# Patient Record
Sex: FEMALE | Race: WHITE | NOT HISPANIC OR LATINO | Employment: OTHER | URBAN - METROPOLITAN AREA
[De-identification: names, ages, dates, MRNs, and addresses within clinical notes are randomized per-mention and may not be internally consistent; named-entity substitution may affect disease eponyms.]

---

## 2020-08-17 ENCOUNTER — OFFICE VISIT (OUTPATIENT)
Dept: FAMILY MEDICINE CLINIC | Facility: CLINIC | Age: 75
End: 2020-08-17

## 2020-08-17 VITALS
OXYGEN SATURATION: 95 % | HEART RATE: 72 BPM | BODY MASS INDEX: 35.91 KG/M2 | TEMPERATURE: 97.7 F | HEIGHT: 61 IN | SYSTOLIC BLOOD PRESSURE: 138 MMHG | WEIGHT: 190.2 LBS | DIASTOLIC BLOOD PRESSURE: 80 MMHG

## 2020-08-17 DIAGNOSIS — I10 BENIGN ESSENTIAL HTN: Primary | ICD-10-CM

## 2020-08-17 DIAGNOSIS — K21.9 GASTROESOPHAGEAL REFLUX DISEASE WITHOUT ESOPHAGITIS: ICD-10-CM

## 2020-08-17 DIAGNOSIS — I25.10 CORONARY ARTERY DISEASE INVOLVING NATIVE CORONARY ARTERY OF NATIVE HEART WITHOUT ANGINA PECTORIS: ICD-10-CM

## 2020-08-17 DIAGNOSIS — L30.9 ECZEMA, UNSPECIFIED TYPE: ICD-10-CM

## 2020-08-17 DIAGNOSIS — N64.52 NIPPLE DISCHARGE IN FEMALE: ICD-10-CM

## 2020-08-17 DIAGNOSIS — Z80.0 FAMILY HISTORY OF COLON CANCER IN FATHER: ICD-10-CM

## 2020-08-17 DIAGNOSIS — R21 RASH AND NONSPECIFIC SKIN ERUPTION: ICD-10-CM

## 2020-08-17 DIAGNOSIS — E78.5 HYPERLIPIDEMIA, UNSPECIFIED HYPERLIPIDEMIA TYPE: ICD-10-CM

## 2020-08-17 DIAGNOSIS — Z12.11 COLON CANCER SCREENING: ICD-10-CM

## 2020-08-17 PROBLEM — E07.9 THYROID DISEASE: Status: ACTIVE | Noted: 2020-08-17

## 2020-08-17 PROBLEM — I21.9 MYOCARDIAL INFARCTION: Status: ACTIVE | Noted: 2020-08-17

## 2020-08-17 PROBLEM — M50.20 CERVICAL HERNIATED DISC: Status: ACTIVE | Noted: 2020-08-17

## 2020-08-17 PROBLEM — I44.7 LBBB (LEFT BUNDLE BRANCH BLOCK): Status: ACTIVE | Noted: 2020-08-17

## 2020-08-17 PROBLEM — M51.26 LUMBAR HERNIATED DISC: Status: ACTIVE | Noted: 2020-08-17

## 2020-08-17 PROBLEM — Z95.5 S/P CORONARY ARTERY STENT PLACEMENT: Status: ACTIVE | Noted: 2020-08-17

## 2020-08-17 PROCEDURE — 99203 OFFICE O/P NEW LOW 30 MIN: CPT | Performed by: INTERNAL MEDICINE

## 2020-08-17 RX ORDER — ASPIRIN 325 MG
325 TABLET ORAL DAILY
COMMUNITY
End: 2022-09-19 | Stop reason: ALTCHOICE

## 2020-08-17 RX ORDER — MULTIVITAMIN
1 CAPSULE ORAL
COMMUNITY

## 2020-08-17 RX ORDER — LORATADINE 10 MG/1
10 TABLET ORAL DAILY
COMMUNITY
End: 2020-12-08

## 2020-08-17 RX ORDER — CHLORAL HYDRATE 500 MG
3 CAPSULE ORAL DAILY
COMMUNITY
End: 2021-03-23

## 2020-08-17 RX ORDER — COVID-19 ANTIGEN TEST
KIT MISCELLANEOUS
COMMUNITY
End: 2021-03-23

## 2020-08-17 RX ORDER — LOSARTAN POTASSIUM 25 MG/1
25 TABLET ORAL NIGHTLY
COMMUNITY
Start: 2020-02-28 | End: 2022-09-19

## 2020-08-17 RX ORDER — CARVEDILOL 25 MG/1
25 TABLET ORAL 2 TIMES DAILY
COMMUNITY
Start: 2020-02-28

## 2020-08-17 RX ORDER — OMEPRAZOLE 20 MG/1
20 CAPSULE, DELAYED RELEASE ORAL DAILY
COMMUNITY
End: 2022-09-19 | Stop reason: ALTCHOICE

## 2020-08-17 NOTE — PROGRESS NOTES
Subjective   Mayi Hannon is a 75 y.o. female.     Chief Complaint   Patient presents with   • Rash       History of Present Illness   Establishing new MD.  Has not really had a regular PMD follow up.  History of MI 2012 with no current CP, SOA, weakness, palpitations.  Intermittent right breast brownish discharge. For the last 2 years not changing.  Last mammogram 4-5 years ago.  Denies tenderness or lumps.  Patient with intermittent rash with red spots that are hard and red then some clear fluid and itching for the last 2 months.    The following portions of the patient's history were reviewed and updated as appropriate: allergies, current medications, past family history, past medical history, past social history, past surgical history and problem list.    Depression Screen:  PHQ-2/PHQ-9 Depression Screening 8/17/2020   Little interest or pleasure in doing things 0   Feeling down, depressed, or hopeless 0   Total Score 0       Past Medical History:   Diagnosis Date   • Coronary artery disease    • GERD (gastroesophageal reflux disease)    • History of chicken pox    • History of MRSA infection    • Hyperlipidemia    • Hypertension        Past Surgical History:   Procedure Laterality Date   • BILATERAL OOPHORECTOMY     • BREAST EXCISIONAL BIOPSY Left     benign findings   • CHOLECYSTECTOMY     • CORONARY STENT PLACEMENT  2012    two stents placed   • HYSTERECTOMY      29 years of age       Family History   Problem Relation Age of Onset   • Colon cancer Father    • Heart disease Father    • Ulcers Father    • Other Sister         anaphylactic shock   • Brain cancer Brother    • Stroke Brother    • Heart disease Sister        Social History     Socioeconomic History   • Marital status:      Spouse name: Not on file   • Number of children: Not on file   • Years of education: Not on file   • Highest education level: Not on file   Tobacco Use   • Smoking status: Former Smoker   • Smokeless tobacco: Never Used    Substance and Sexual Activity   • Alcohol use: Yes     Frequency: Never   • Drug use: Never   • Sexual activity: Defer       Current Outpatient Medications   Medication Sig Dispense Refill   • aspirin 325 MG tablet Take 325 mg by mouth Daily.     • carvedilol (COREG) 25 MG tablet Take 25 mg by mouth 2 (two) times a day.     • loratadine (CLARITIN) 10 MG tablet Take 10 mg by mouth Daily.     • losartan (COZAAR) 25 MG tablet Take 25 mg by mouth Daily.     • Multiple Vitamin (MULTIVITAMIN) capsule Take 1 capsule by mouth.     • Naproxen Sodium 220 MG capsule Take  by mouth.     • Omega-3 Fatty Acids (FISH OIL) 1000 MG capsule capsule Take 3 capsules by mouth Daily.     • omeprazole (priLOSEC) 20 MG capsule Take 20 mg by mouth Daily.     • triamcinolone (KENALOG) 0.1 % ointment Apply  topically to the appropriate area as directed 3 (Three) Times a Day. 30 g 1     No current facility-administered medications for this visit.        Review of Systems   Constitutional: Negative for activity change, appetite change, fatigue, fever, unexpected weight gain and unexpected weight loss.   HENT: Negative for nosebleeds, rhinorrhea, trouble swallowing and voice change.    Eyes: Negative for visual disturbance.   Respiratory: Negative for cough, chest tightness, shortness of breath and wheezing.    Cardiovascular: Negative for chest pain, palpitations and leg swelling.   Gastrointestinal: Negative for abdominal pain, blood in stool, constipation, diarrhea, nausea, vomiting, GERD and indigestion.   Genitourinary: Negative for dysuria, frequency and hematuria.   Musculoskeletal: Negative for arthralgias, back pain and myalgias.   Skin: Negative for rash and bruise.   Neurological: Negative for dizziness, tremors, weakness, light-headedness, numbness, headache and memory problem.   Hematological: Negative for adenopathy. Does not bruise/bleed easily.   Psychiatric/Behavioral: Negative for sleep disturbance and depressed mood. The  "patient is not nervous/anxious.        Objective   /80 (BP Location: Left arm, Patient Position: Sitting, Cuff Size: Large Adult)   Pulse 72   Temp 97.7 °F (36.5 °C) (Temporal)   Ht 154.9 cm (61\")   Wt 86.3 kg (190 lb 3.2 oz)   SpO2 95%   BMI 35.94 kg/m²     Physical Exam   Constitutional: She is oriented to person, place, and time. She appears well-developed and well-nourished. No distress.   HENT:   Head: Normocephalic and atraumatic.   Right Ear: External ear normal.   Left Ear: External ear normal.   Nose: Nose normal.   Mouth/Throat: Oropharynx is clear and moist.   Eyes: Pupils are equal, round, and reactive to light. Conjunctivae and EOM are normal.   Neck: Normal range of motion. Neck supple. No tracheal deviation present. No thyromegaly present.   Cardiovascular: Normal rate, regular rhythm, normal heart sounds and intact distal pulses. Exam reveals no gallop and no friction rub.   No murmur heard.  Pulmonary/Chest: Effort normal and breath sounds normal. No respiratory distress.   Abdominal: Soft. Bowel sounds are normal. She exhibits no mass. There is no tenderness. There is no guarding.   Musculoskeletal: Normal range of motion. She exhibits no edema.   Lymphadenopathy:     She has no cervical adenopathy.   Neurological: She is alert and oriented to person, place, and time. She displays normal reflexes. She exhibits normal muscle tone.   Skin: Skin is warm and dry. Capillary refill takes less than 2 seconds. No rash noted. She is not diaphoretic.   Psychiatric: She has a normal mood and affect. Her behavior is normal. Judgment and thought content normal.   Nursing note and vitals reviewed.      No results found for this or any previous visit (from the past 2016 hour(s)).  Assessment/Plan   Mayi was seen today for rash.    Diagnoses and all orders for this visit:    Benign essential HTN    Hyperlipidemia, unspecified hyperlipidemia type    Coronary artery disease involving native coronary " artery of native heart without angina pectoris    Gastroesophageal reflux disease without esophagitis    Nipple discharge in female  -     Mammo Diagnostic Bilateral With CAD; Future  -     US Breast Bilateral Complete; Future    Colon cancer screening  -     Ambulatory Referral For Screening Colonoscopy    Eczema, unspecified type  -     triamcinolone (KENALOG) 0.1 % ointment; Apply  topically to the appropriate area as directed 3 (Three) Times a Day.    Rash and nonspecific skin eruption  -     triamcinolone (KENALOG) 0.1 % ointment; Apply  topically to the appropriate area as directed 3 (Three) Times a Day.    Family history of colon cancer in father  -     Ambulatory Referral For Screening Colonoscopy    Hypertension is currently controlled no changes are needed at this time no change in medications either.  Is to continue follow-up with cardiology as scheduled who is following her cholesterol and her blood pressure.  GERD is controlled with the omeprazole 20 mg only.  Patient with a nipple discharge on the right will obtain mammogram and likely ultrasound evaluation bilaterally with history of benign breast nodule removal in the past on the left.  Rash possibly some mild eczema versus other dermatitis.  Will give triamcinolone and observe.  If she has recurring blisters with drainage she is to come to the office at that time for which we can obtain a culture of the fluid.  Patient is in need of her screening colonoscopy which we will schedule.

## 2020-08-28 ENCOUNTER — OFFICE VISIT (OUTPATIENT)
Dept: FAMILY MEDICINE CLINIC | Facility: CLINIC | Age: 75
End: 2020-08-28

## 2020-08-28 VITALS
OXYGEN SATURATION: 96 % | SYSTOLIC BLOOD PRESSURE: 144 MMHG | WEIGHT: 189.2 LBS | HEART RATE: 73 BPM | DIASTOLIC BLOOD PRESSURE: 72 MMHG | TEMPERATURE: 98.3 F | BODY MASS INDEX: 35.72 KG/M2 | HEIGHT: 61 IN

## 2020-08-28 DIAGNOSIS — E78.5 HYPERLIPIDEMIA, UNSPECIFIED HYPERLIPIDEMIA TYPE: ICD-10-CM

## 2020-08-28 DIAGNOSIS — R35.0 URINARY FREQUENCY: ICD-10-CM

## 2020-08-28 DIAGNOSIS — R21 RASH AND NONSPECIFIC SKIN ERUPTION: Primary | ICD-10-CM

## 2020-08-28 LAB
BILIRUB BLD-MCNC: NEGATIVE MG/DL
CLARITY, POC: CLEAR
COLOR UR: YELLOW
GLUCOSE UR STRIP-MCNC: NEGATIVE MG/DL
KETONES UR QL: NEGATIVE
LEUKOCYTE EST, POC: NEGATIVE
NITRITE UR-MCNC: NEGATIVE MG/ML
PH UR: 6 [PH] (ref 5–8)
PROT UR STRIP-MCNC: NEGATIVE MG/DL
RBC # UR STRIP: NEGATIVE /UL
SP GR UR: 1.01 (ref 1–1.03)
UROBILINOGEN UR QL: NORMAL

## 2020-08-28 PROCEDURE — 99214 OFFICE O/P EST MOD 30 MIN: CPT | Performed by: NURSE PRACTITIONER

## 2020-08-28 PROCEDURE — 81003 URINALYSIS AUTO W/O SCOPE: CPT | Performed by: NURSE PRACTITIONER

## 2020-08-28 NOTE — PATIENT INSTRUCTIONS
Make sure drinking adequate liquids.  Keep diary of rash symptoms.  Follow up pending lab results.  Follow up if symptoms persist or worsen.

## 2020-08-29 LAB
ALBUMIN SERPL-MCNC: 4.4 G/DL (ref 3.7–4.7)
ALBUMIN/GLOB SERPL: 1.8 {RATIO} (ref 1.2–2.2)
ALP SERPL-CCNC: 92 IU/L (ref 39–117)
ALT SERPL-CCNC: 33 IU/L (ref 0–32)
AST SERPL-CCNC: 33 IU/L (ref 0–40)
BASOPHILS # BLD AUTO: 0.1 X10E3/UL (ref 0–0.2)
BASOPHILS NFR BLD AUTO: 1 %
BILIRUB SERPL-MCNC: 0.4 MG/DL (ref 0–1.2)
BUN SERPL-MCNC: 16 MG/DL (ref 8–27)
BUN/CREAT SERPL: 15 (ref 12–28)
CALCIUM SERPL-MCNC: 10.5 MG/DL (ref 8.7–10.3)
CHLORIDE SERPL-SCNC: 104 MMOL/L (ref 96–106)
CHOLEST SERPL-MCNC: 257 MG/DL (ref 100–199)
CO2 SERPL-SCNC: 21 MMOL/L (ref 20–29)
CREAT SERPL-MCNC: 1.04 MG/DL (ref 0.57–1)
EOSINOPHIL # BLD AUTO: 0.2 X10E3/UL (ref 0–0.4)
EOSINOPHIL NFR BLD AUTO: 3 %
ERYTHROCYTE [DISTWIDTH] IN BLOOD BY AUTOMATED COUNT: 12.5 % (ref 11.7–15.4)
GLOBULIN SER CALC-MCNC: 2.4 G/DL (ref 1.5–4.5)
GLUCOSE SERPL-MCNC: 104 MG/DL (ref 65–99)
HCT VFR BLD AUTO: 42.2 % (ref 34–46.6)
HDLC SERPL-MCNC: 45 MG/DL
HGB BLD-MCNC: 13.8 G/DL (ref 11.1–15.9)
IMM GRANULOCYTES # BLD AUTO: 0 X10E3/UL (ref 0–0.1)
IMM GRANULOCYTES NFR BLD AUTO: 0 %
LDLC SERPL CALC-MCNC: 156 MG/DL (ref 0–99)
LDLC/HDLC SERPL: 3.5 RATIO (ref 0–3.2)
LYMPHOCYTES # BLD AUTO: 2.3 X10E3/UL (ref 0.7–3.1)
LYMPHOCYTES NFR BLD AUTO: 36 %
MCH RBC QN AUTO: 30 PG (ref 26.6–33)
MCHC RBC AUTO-ENTMCNC: 32.7 G/DL (ref 31.5–35.7)
MCV RBC AUTO: 92 FL (ref 79–97)
MONOCYTES # BLD AUTO: 0.4 X10E3/UL (ref 0.1–0.9)
MONOCYTES NFR BLD AUTO: 6 %
NEUTROPHILS # BLD AUTO: 3.4 X10E3/UL (ref 1.4–7)
NEUTROPHILS NFR BLD AUTO: 54 %
PLATELET # BLD AUTO: 187 X10E3/UL (ref 150–450)
POTASSIUM SERPL-SCNC: 4.7 MMOL/L (ref 3.5–5.2)
PROT SERPL-MCNC: 6.8 G/DL (ref 6–8.5)
RBC # BLD AUTO: 4.6 X10E6/UL (ref 3.77–5.28)
SODIUM SERPL-SCNC: 141 MMOL/L (ref 134–144)
TRIGL SERPL-MCNC: 280 MG/DL (ref 0–149)
VLDLC SERPL CALC-MCNC: 56 MG/DL (ref 5–40)
WBC # BLD AUTO: 6.3 X10E3/UL (ref 3.4–10.8)

## 2020-08-30 LAB
BACTERIA UR CULT: NORMAL
BACTERIA UR CULT: NORMAL

## 2020-08-31 DIAGNOSIS — E83.52 HYPERCALCEMIA: Primary | ICD-10-CM

## 2020-09-01 DIAGNOSIS — E83.52 HYPERCALCEMIA: ICD-10-CM

## 2020-09-01 LAB
BACTERIA SPEC AEROBE CULT: NORMAL
BACTERIA SPEC ANAEROBE CULT: NORMAL
BACTERIA SPEC CULT: NORMAL
BACTERIA SPEC CULT: NORMAL

## 2020-09-12 LAB
25(OH)D3+25(OH)D2 SERPL-MCNC: 24 NG/ML (ref 30–100)
CALCIUM SERPL-MCNC: 10.4 MG/DL (ref 8.7–10.3)
INTACT PTH: ABNORMAL
PTH-INTACT SERPL-MCNC: 43 PG/ML (ref 15–65)

## 2020-09-14 ENCOUNTER — PREP FOR SURGERY (OUTPATIENT)
Dept: OTHER | Facility: HOSPITAL | Age: 75
End: 2020-09-14

## 2020-09-14 DIAGNOSIS — Z80.0 FAMILY HISTORY OF MALIGNANT NEOPLASM OF COLON: Primary | ICD-10-CM

## 2020-09-15 DIAGNOSIS — E55.9 VITAMIN D DEFICIENCY: Primary | ICD-10-CM

## 2020-09-17 ENCOUNTER — APPOINTMENT (OUTPATIENT)
Dept: WOMENS IMAGING | Facility: HOSPITAL | Age: 75
End: 2020-09-17

## 2020-09-17 PROCEDURE — 76641 ULTRASOUND BREAST COMPLETE: CPT | Performed by: RADIOLOGY

## 2020-09-17 PROCEDURE — G0279 TOMOSYNTHESIS, MAMMO: HCPCS | Performed by: RADIOLOGY

## 2020-09-17 PROCEDURE — 77066 DX MAMMO INCL CAD BI: CPT | Performed by: RADIOLOGY

## 2020-09-17 PROCEDURE — 77066 DX MAMMO INCL CAD BI: CPT

## 2020-09-18 DIAGNOSIS — N64.52 NIPPLE DISCHARGE IN FEMALE: ICD-10-CM

## 2020-10-08 PROBLEM — Z80.0 FAMILY HISTORY OF MALIGNANT NEOPLASM OF COLON: Status: ACTIVE | Noted: 2020-10-08

## 2020-11-16 ENCOUNTER — TRANSCRIBE ORDERS (OUTPATIENT)
Dept: SLEEP MEDICINE | Facility: HOSPITAL | Age: 75
End: 2020-11-16

## 2020-11-16 DIAGNOSIS — Z01.818 OTHER SPECIFIED PRE-OPERATIVE EXAMINATION: Primary | ICD-10-CM

## 2020-12-07 ENCOUNTER — LAB (OUTPATIENT)
Dept: LAB | Facility: HOSPITAL | Age: 75
End: 2020-12-07

## 2020-12-07 DIAGNOSIS — Z01.818 OTHER SPECIFIED PRE-OPERATIVE EXAMINATION: ICD-10-CM

## 2020-12-07 PROCEDURE — U0004 COV-19 TEST NON-CDC HGH THRU: HCPCS

## 2020-12-07 PROCEDURE — C9803 HOPD COVID-19 SPEC COLLECT: HCPCS

## 2020-12-08 LAB — SARS-COV-2 RNA RESP QL NAA+PROBE: NOT DETECTED

## 2020-12-08 RX ORDER — BIOTIN 5 MG
3000 TABLET ORAL DAILY
COMMUNITY

## 2020-12-09 ENCOUNTER — HOSPITAL ENCOUNTER (OUTPATIENT)
Facility: HOSPITAL | Age: 75
Setting detail: HOSPITAL OUTPATIENT SURGERY
Discharge: HOME | End: 2020-12-09
Attending: INTERNAL MEDICINE | Admitting: INTERNAL MEDICINE

## 2020-12-09 ENCOUNTER — ANESTHESIA (OUTPATIENT)
Dept: GASTROENTEROLOGY | Facility: HOSPITAL | Age: 75
End: 2020-12-09

## 2020-12-09 ENCOUNTER — ANESTHESIA EVENT (OUTPATIENT)
Dept: GASTROENTEROLOGY | Facility: HOSPITAL | Age: 75
End: 2020-12-09

## 2020-12-09 VITALS
HEART RATE: 75 BPM | BODY MASS INDEX: 36.06 KG/M2 | DIASTOLIC BLOOD PRESSURE: 87 MMHG | SYSTOLIC BLOOD PRESSURE: 180 MMHG | HEIGHT: 61 IN | WEIGHT: 191 LBS | TEMPERATURE: 97 F | OXYGEN SATURATION: 9 % | RESPIRATION RATE: 16 BRPM

## 2020-12-09 PROCEDURE — S0260 H&P FOR SURGERY: HCPCS | Performed by: INTERNAL MEDICINE

## 2020-12-09 PROCEDURE — G0105 COLORECTAL SCRN; HI RISK IND: HCPCS | Performed by: INTERNAL MEDICINE

## 2020-12-09 PROCEDURE — 25010000002 PROPOFOL 10 MG/ML EMULSION: Performed by: ANESTHESIOLOGY

## 2020-12-09 RX ORDER — PROPOFOL 10 MG/ML
VIAL (ML) INTRAVENOUS CONTINUOUS PRN
Status: DISCONTINUED | OUTPATIENT
Start: 2020-12-09 | End: 2020-12-09 | Stop reason: SURG

## 2020-12-09 RX ORDER — SODIUM CHLORIDE, SODIUM LACTATE, POTASSIUM CHLORIDE, CALCIUM CHLORIDE 600; 310; 30; 20 MG/100ML; MG/100ML; MG/100ML; MG/100ML
30 INJECTION, SOLUTION INTRAVENOUS CONTINUOUS PRN
Status: DISCONTINUED | OUTPATIENT
Start: 2020-12-09 | End: 2020-12-09 | Stop reason: HOSPADM

## 2020-12-09 RX ORDER — PROPOFOL 10 MG/ML
VIAL (ML) INTRAVENOUS AS NEEDED
Status: DISCONTINUED | OUTPATIENT
Start: 2020-12-09 | End: 2020-12-09 | Stop reason: SURG

## 2020-12-09 RX ORDER — LIDOCAINE HYDROCHLORIDE 20 MG/ML
INJECTION, SOLUTION INFILTRATION; PERINEURAL AS NEEDED
Status: DISCONTINUED | OUTPATIENT
Start: 2020-12-09 | End: 2020-12-09 | Stop reason: SURG

## 2020-12-09 RX ORDER — SODIUM CHLORIDE 0.9 % (FLUSH) 0.9 %
10 SYRINGE (ML) INJECTION AS NEEDED
Status: DISCONTINUED | OUTPATIENT
Start: 2020-12-09 | End: 2020-12-09 | Stop reason: HOSPADM

## 2020-12-09 RX ADMIN — PROPOFOL 120 MG: 10 INJECTION, EMULSION INTRAVENOUS at 10:59

## 2020-12-09 RX ADMIN — PROPOFOL 160 MCG/KG/MIN: 10 INJECTION, EMULSION INTRAVENOUS at 10:59

## 2020-12-09 RX ADMIN — LIDOCAINE HYDROCHLORIDE 60 MG: 20 INJECTION, SOLUTION INFILTRATION; PERINEURAL at 10:59

## 2020-12-09 RX ADMIN — SODIUM CHLORIDE, POTASSIUM CHLORIDE, SODIUM LACTATE AND CALCIUM CHLORIDE 30 ML/HR: 600; 310; 30; 20 INJECTION, SOLUTION INTRAVENOUS at 10:46

## 2020-12-09 NOTE — ANESTHESIA PREPROCEDURE EVALUATION
Anesthesia Evaluation     Patient summary reviewed   NPO Solid Status: > 8 hours  NPO Liquid Status: > 2 hours           Airway   Mallampati: II  TM distance: >3 FB  Neck ROM: full  Dental      Pulmonary     breath sounds clear to auscultation  (+) a smoker Former,   (-) shortness of breath  Cardiovascular   Exercise tolerance: good (4-7 METS)    Rhythm: regular  Rate: normal    (+) hypertension, past MI  >12 months, CAD, cardiac stents more than 12 months ago dysrhythmias (LBBB), hyperlipidemia,   (-) angina, HERNANDEZ    ROS comment: Denies CV issues since stent placement    Neuro/Psych  GI/Hepatic/Renal/Endo    (+)  GERD,      Musculoskeletal     Abdominal    Substance History      OB/GYN          Other                      Anesthesia Plan    ASA 3     MAC   (MAC anesthesia discussed with patient and/or patient representative. Risks (including but not limited to intra-op awareness), benefits, and alternatives were discussed. Understanding was voiced with an agreement to proceed with a MAC technique and General as a backup option.   )  intravenous induction     Anesthetic plan, all risks, benefits, and alternatives have been provided, discussed and informed consent has been obtained with: patient.

## 2020-12-09 NOTE — DISCHARGE INSTRUCTIONS
For the next 24 hours patient needs to be with a responsible adult.    For 24 hours DO NOT drive, operate machinery, appliances, drink alcohol, make important decisions or sign legal documents.    Start with a light or bland diet if you are feeling sick to your stomach otherwise advance to regular diet as tolerated.    Follow recommendations on procedure report if provided by your doctor.    Call Dr BERNARDO for problems [phone]    Problems may include but not limited to: large amounts of bleeding, trouble breathing, repeated vomiting, severe unrelieved pain, fever or chills.

## 2020-12-09 NOTE — H&P
[facility]  Pre Procedure History & Physical    Chief Complaint:   fh crc/polyps    Subjective     HPI:   76 yo here today for colonoscopy.  Pt reports + FH CRC/polyps (father).  Last exam 2011    Past Medical History:   Past Medical History:   Diagnosis Date   • Coronary artery disease    • GERD (gastroesophageal reflux disease)    • History of chicken pox    • History of MRSA infection 2014    H/O ON MULTIPLE SITES ON BODY (OVER LYMPH NODES)   • Hyperlipidemia    • Hypertension        Past Surgical History:  Past Surgical History:   Procedure Laterality Date   • BILATERAL OOPHORECTOMY     • BREAST EXCISIONAL BIOPSY Left     benign findings   • CHOLECYSTECTOMY     • CORONARY STENT PLACEMENT  2012    two stents placed   • HYSTERECTOMY      29 years of age       Family History:  Family History   Problem Relation Age of Onset   • Colon cancer Father    • Heart disease Father    • Ulcers Father    • Other Sister         anaphylactic shock   • Brain cancer Brother    • Stroke Brother    • Heart disease Sister    • Malig Hyperthermia Neg Hx        Social History:   reports that she quit smoking about 47 years ago. She has never used smokeless tobacco. She reports current alcohol use. She reports that she does not use drugs.    Medications:   Medications Prior to Admission   Medication Sig Dispense Refill Last Dose   • aspirin 325 MG tablet Take 325 mg by mouth Daily.      • carvedilol (COREG) 25 MG tablet Take 25 mg by mouth 2 (two) times a day.      • Krill Oil 1000 MG capsule Take 1,000 mg by mouth Daily.      • losartan (COZAAR) 25 MG tablet Take 25 mg by mouth Every Night.      • Multiple Vitamin (MULTIVITAMIN) capsule Take 1 capsule by mouth.      • Naproxen Sodium 220 MG capsule Take  by mouth.      • Omega-3 Fatty Acids (FISH OIL) 1000 MG capsule capsule Take 3 capsules by mouth Daily.      • omeprazole (priLOSEC) 20 MG capsule Take 20 mg by mouth Daily.      • triamcinolone (KENALOG) 0.1  "% ointment Apply  topically to the appropriate area as directed 3 (Three) Times a Day. 30 g 1        Allergies:  Hydrocodone-acetaminophen, Statins, Niacin, Welchol [colesevelam hcl], Ace inhibitors, and Erythromycin    ROS:    Pertinent items are noted in HPI, all other systems reviewed and negative     Objective     Blood pressure 169/98, pulse 74, resp. rate 14, height 154.9 cm (61\"), weight 86.6 kg (191 lb), SpO2 98 %.    Physical Exam   Constitutional: Pt is oriented to person, place, and time and well-developed, well-nourished, and in no distress.   Mouth/Throat: Oropharynx is clear and moist.   Neck: Normal range of motion.   Cardiovascular: Normal rate, regular rhythm    Pulmonary/Chest: Effort normal    Abdominal: Soft. Nontender  Skin: Skin is warm and dry.   Psychiatric: Mood, memory, affect and judgment normal.     Assessment/Plan     Diagnosis:  Screening for colon cancer-fh crc/polyps    Anticipated Surgical Procedure:  colonoscopy    The risks, benefits, and alternatives of this procedure have been discussed with the patient or the responsible party- the patient understands and agrees to proceed.                                                              "

## 2020-12-09 NOTE — ANESTHESIA POSTPROCEDURE EVALUATION
"Patient: Mayi Hannon    Procedure Summary     Date: 12/09/20 Room / Location: [facility] ENDOSCOPY 4 / [facility] ENDOSCOPY    Anesthesia Start: 1055 Anesthesia Stop: 1120    Procedure: COLONOSCOPY (N/A ) Diagnosis:       Family history of malignant neoplasm of colon      (Family history of malignant neoplasm of colon [Z80.0])    Surgeon: Page Au MD Provider: Hudson Ramirez MD    Anesthesia Type: MAC ASA Status: 3          Anesthesia Type: MAC    Vitals  Vitals Value Taken Time   /87 12/09/20 1140   Temp 36.1 °C (97 °F) 12/09/20 1128   Pulse 75 12/09/20 1140   Resp 16 12/09/20 1140   SpO2 9 % 12/09/20 1140           Post Anesthesia Care and Evaluation    Patient location during evaluation: bedside  Patient participation: complete - patient participated  Level of consciousness: awake and alert  Pain management: adequate  Airway patency: patent  Anesthetic complications: No anesthetic complications    Cardiovascular status: acceptable  Respiratory status: acceptable  Hydration status: acceptable    Comments: /87 (BP Location: Left arm, Patient Position: Lying)   Pulse 75   Temp 36.1 °C (97 °F) (Oral)   Resp 16   Ht 154.9 cm (61\")   Wt 86.6 kg (191 lb)   SpO2 (!) 9%   BMI 36.09 kg/m²       "

## 2021-03-09 DIAGNOSIS — Z23 IMMUNIZATION DUE: ICD-10-CM

## 2021-03-18 ENCOUNTER — APPOINTMENT (OUTPATIENT)
Dept: WOMENS IMAGING | Facility: HOSPITAL | Age: 76
End: 2021-03-18

## 2021-03-18 PROCEDURE — 76641 ULTRASOUND BREAST COMPLETE: CPT | Performed by: RADIOLOGY

## 2021-03-19 ENCOUNTER — TELEPHONE (OUTPATIENT)
Dept: FAMILY MEDICINE CLINIC | Facility: CLINIC | Age: 76
End: 2021-03-19

## 2021-03-23 DIAGNOSIS — R92.8 ABNORMAL MAMMOGRAM OF BOTH BREASTS: Primary | ICD-10-CM

## 2021-10-06 ENCOUNTER — APPOINTMENT (OUTPATIENT)
Dept: WOMENS IMAGING | Facility: HOSPITAL | Age: 76
End: 2021-10-06

## 2021-10-06 PROCEDURE — 76641 ULTRASOUND BREAST COMPLETE: CPT | Performed by: RADIOLOGY

## 2021-10-06 PROCEDURE — 77066 DX MAMMO INCL CAD BI: CPT | Performed by: RADIOLOGY

## 2021-10-06 PROCEDURE — G0279 TOMOSYNTHESIS, MAMMO: HCPCS | Performed by: RADIOLOGY

## 2021-10-12 DIAGNOSIS — R92.8 ABNORMAL ULTRASOUND OF BREAST: Primary | ICD-10-CM

## 2022-04-19 ENCOUNTER — APPOINTMENT (OUTPATIENT)
Dept: WOMENS IMAGING | Facility: HOSPITAL | Age: 77
End: 2022-04-19

## 2022-04-19 PROCEDURE — 76641 ULTRASOUND BREAST COMPLETE: CPT | Performed by: RADIOLOGY

## 2022-04-21 DIAGNOSIS — R92.8 ABNORMAL ULTRASOUND OF BREAST: ICD-10-CM

## 2022-09-19 ENCOUNTER — OFFICE VISIT (OUTPATIENT)
Dept: FAMILY MEDICINE CLINIC | Facility: CLINIC | Age: 77
End: 2022-09-19

## 2022-09-19 VITALS
HEART RATE: 87 BPM | OXYGEN SATURATION: 98 % | SYSTOLIC BLOOD PRESSURE: 120 MMHG | BODY MASS INDEX: 33.42 KG/M2 | HEIGHT: 61 IN | TEMPERATURE: 98.4 F | DIASTOLIC BLOOD PRESSURE: 66 MMHG | WEIGHT: 177 LBS

## 2022-09-19 DIAGNOSIS — R73.01 ELEVATED FASTING BLOOD SUGAR: ICD-10-CM

## 2022-09-19 DIAGNOSIS — Z95.1 S/P CABG (CORONARY ARTERY BYPASS GRAFT): ICD-10-CM

## 2022-09-19 DIAGNOSIS — Z09 HOSPITAL DISCHARGE FOLLOW-UP: Primary | ICD-10-CM

## 2022-09-19 DIAGNOSIS — E78.5 HYPERLIPIDEMIA, UNSPECIFIED HYPERLIPIDEMIA TYPE: ICD-10-CM

## 2022-09-19 DIAGNOSIS — I10 BENIGN ESSENTIAL HTN: ICD-10-CM

## 2022-09-19 PROCEDURE — 99214 OFFICE O/P EST MOD 30 MIN: CPT | Performed by: INTERNAL MEDICINE

## 2022-09-19 RX ORDER — ROSUVASTATIN CALCIUM 5 MG/1
5 TABLET, COATED ORAL
COMMUNITY
Start: 2022-07-29

## 2022-09-19 RX ORDER — ASPIRIN 81 MG/1
162 TABLET ORAL DAILY
COMMUNITY
Start: 2022-08-30 | End: 2023-03-03

## 2022-09-19 RX ORDER — MELATONIN
1000 DAILY
COMMUNITY

## 2022-09-19 RX ORDER — LOSARTAN POTASSIUM 100 MG/1
100 TABLET ORAL DAILY
COMMUNITY
Start: 2022-08-29 | End: 2022-12-19 | Stop reason: SDUPTHER

## 2022-09-19 RX ORDER — TRAMADOL HYDROCHLORIDE 50 MG/1
TABLET ORAL
COMMUNITY
Start: 2022-08-26 | End: 2022-12-19 | Stop reason: SINTOL

## 2022-09-19 RX ORDER — AMLODIPINE BESYLATE 5 MG/1
5 TABLET ORAL DAILY
COMMUNITY
Start: 2022-08-29 | End: 2022-12-19 | Stop reason: SDUPTHER

## 2022-09-19 RX ORDER — NITROGLYCERIN 0.4 MG/1
1 TABLET SUBLINGUAL
COMMUNITY
Start: 2022-07-29

## 2022-09-19 RX ORDER — CLOPIDOGREL BISULFATE 75 MG/1
TABLET ORAL
COMMUNITY
Start: 2022-08-29 | End: 2022-12-19

## 2022-09-19 NOTE — PATIENT INSTRUCTIONS
Medicare Wellness  Personal Prevention Plan of Service     Date of Office Visit:    Encounter Provider:  Rodri Haley MD  Place of Service:  [facility]  Patient Name: Mayi Hannon  :  1945    As part of the Medicare Wellness portion of your visit today, we are providing you with this personalized preventive plan of services (PPPS). This plan is based upon recommendations of the [place] Preventive Services Task Force (USPSTF) and the Advisory Committee on Immunization Practices (ACIP).    This lists the preventive care services that should be considered, and provides dates of when you are due. Items listed as completed are up-to-date and do not require any further intervention.    Health Maintenance   Topic Date Due    DXA SCAN  Never done    TDAP/TD VACCINES (1 - Tdap) Never done    ZOSTER VACCINE (1 of 2) Never done    Pneumococcal Vaccine 65+ (1 - PCV) Never done    HEPATITIS C SCREENING  Never done    ANNUAL WELLNESS VISIT  Never done    INFLUENZA VACCINE  10/01/2022    LIPID PANEL  2023    COVID-19 Vaccine  Completed       No orders of the defined types were placed in this encounter.      Return in about 3 months (around 2022) for Next scheduled follow up.

## 2022-09-19 NOTE — PROGRESS NOTES
Subjective   Mayi Hannon is a 77 y.o. female.     Chief Complaint   Patient presents with   • Hospital Follow Up Visit     Heart surgery 8/29/22       History of Present Illness   Answers for HPI/ROS submitted by the patient on 9/17/2022  Please describe your symptoms.: Follow up after heart by-pass surgery  Have you had these symptoms before?: No  How long have you been having these symptoms?: Greater than 2 weeks  Please list any medications you are currently taking for this condition.: I will bring to visit  Please describe any probable cause for these symptoms. : Heart blockage  What is the primary reason for your visit?: Other     was present during the history-taking and subsequent discussion (and for part of the physical exam) with this patient.  Patient agrees to the presence of the individual during this visit.    Within 48 business hours after discharge our office contacted her via telephone to coordinate her care and needs.  No flowsheet data found.  Risk for Readmission (LACE) No data recorded    Patient was admitted to [facility]  ALL records were obtained and reviewed and /or discussed with admitting physician  Date of admission 8/24/2022  Date of discharge 8/29/2022  Diagnosis coronary artery disease of the native artery, left bundle branch block, hyperlipidemia, hypertension, GERD, status post CABG 8/25/2022 of LIMA to mid LAD, saphenous graft to the first and second obtuse marginal, and saphenous graft to the posterior descending artery    Hospital Course    77-year-old female who was evaluated in the cardiology office for severe multivessel coronary artery disease and determined to be a suitable candidate for elective open heart surgery.  She underwent coronary artery bypass graft on 8/25/2022 with LIMA to LAD and saphenous graft first and second obtuse marginal and the posterior descending artery.  She was extubated on postop day 1 but required increased oxygen requirements  and carding short course.  She transition nasal cannula later weaned to room air.  She did have a slight elevation in creatinine without obvious acute kidney injury that did resolve.  Physical therapy evaluated and deemed suitable candidate to return home.  There was some elevation in her blood pressure in the hospital and medications were adjusted and she was discharged on day 5.  Patient was noted to have an elevated blood sugar in the hospital of 159 with a sodium of 134 at the time of discharge.  Discharge medications of amlodipine 5 mg daily, aspirin 81 mg daily, Plavix 75 mg daily, Lasix 40 mg daily completed after 14 days, potassium chloride 20 mEq daily completed after 14 days, tramadol 50 mg as needed for pain only.  Losartan 100 mg daily, rosuvastatin 10 mg daily, and carvedilol 25 mg twice daily    Patient is a follow-up with a primary physician, cardiology Dr. Ferrer, [facility], and cardiothoracic surgery Dr. Richard Sims.    Currently c/o mild short of breath and fatigue.  Condition stable    I reviewed and requested records labs and diagnostics from the hospital with the patient and family.  The patient is to follow-up with specialist as discussed and directed.  If any problems arise or further questions develop patient is to call or to contact us for any needs.    The following portions of the patient's history were reviewed and updated as appropriate: allergies, current medications, past family history, past medical history, past social history, past surgical history and problem list.    Past Medical History:   Diagnosis Date   • Coronary artery disease    • GERD (gastroesophageal reflux disease)    • History of chicken pox    • History of MRSA infection 2014    H/O ON MULTIPLE SITES ON BODY (OVER LYMPH NODES)   • Hyperlipidemia    • Hypertension        Past Surgical History:   Procedure Laterality Date   • BILATERAL OOPHORECTOMY     • BREAST EXCISIONAL BIOPSY Left     benign findings   •  CHOLECYSTECTOMY     • COLONOSCOPY N/A 2020    Procedure: COLONOSCOPY;  Surgeon: Page Au MD;  Location: [facility] ENDOSCOPY;  Service: Gastroenterology;  Laterality: N/A;  pre- family hx colon cancer  post-- diverticulosis, hemorrhoids   • CORONARY STENT PLACEMENT      two stents placed   • HYSTERECTOMY      29 years of age       Family History   Problem Relation Age of Onset   • Colon cancer Father    • Heart disease Father    • Ulcers Father    • Other Sister         anaphylactic shock   • Brain cancer Brother    • Stroke Brother    • Heart disease Sister    • Malig Hyperthermia Neg Hx        Social History     Socioeconomic History   • Marital status:    Tobacco Use   • Smoking status: Former Smoker     Quit date:      Years since quittin.7   • Smokeless tobacco: Never Used   Vaping Use   • Vaping Use: Never used   Substance and Sexual Activity   • Alcohol use: Yes     Comment: occ   • Drug use: Never   • Sexual activity: Defer       Current Outpatient Medications   Medication Sig Dispense Refill   • amLODIPine (NORVASC) 5 MG tablet      • aspirin 81 MG EC tablet Take 81 mg by mouth Daily.     • carvedilol (COREG) 25 MG tablet Take 25 mg by mouth 2 (two) times a day.     • cholecalciferol (VITAMIN D3) 25 MCG (1000 UT) tablet Take 1,000 Units by mouth Daily.     • clopidogrel (PLAVIX) 75 MG tablet      • Krill Oil 1000 MG capsule Take 1,000 mg by mouth Daily.     • losartan (COZAAR) 100 MG tablet      • Multiple Vitamin (MULTIVITAMIN) capsule Take 1 capsule by mouth.     • nitroglycerin (NITROSTAT) 0.4 MG SL tablet Place 1 tablet under the tongue Every 5 (Five) Minutes As Needed.     • rosuvastatin (CRESTOR) 5 MG tablet      • traMADol (ULTRAM) 50 MG tablet        No current facility-administered medications for this visit.       Review of Systems   Constitutional: Positive for fatigue. Negative for activity change, appetite change, fever, unexpected weight gain and unexpected  weight loss.   HENT: Negative for nosebleeds, rhinorrhea, trouble swallowing and voice change.    Eyes: Negative for visual disturbance.   Respiratory: Positive for shortness of breath. Negative for cough, chest tightness and wheezing.    Cardiovascular: Negative for chest pain, palpitations and leg swelling.   Gastrointestinal: Negative for abdominal pain, blood in stool, constipation, diarrhea, nausea, vomiting, GERD and indigestion.   Genitourinary: Negative for dysuria, frequency and hematuria.   Musculoskeletal: Negative for arthralgias, back pain and myalgias.   Skin: Negative for rash and wound.   Neurological: Negative for dizziness, tremors, weakness, light-headedness, numbness, headache and memory problem.   Hematological: Negative for adenopathy. Does not bruise/bleed easily.   Psychiatric/Behavioral: Negative for sleep disturbance and depressed mood. The patient is not nervous/anxious.        Objective   Vitals:    09/19/22 1339   BP: 120/66   Pulse: 87   Temp: 98.4 °F (36.9 °C)   SpO2: 98%     Body mass index is 33.44 kg/m².  Physical Exam  Vitals and nursing note reviewed.   Constitutional:       General: She is not in acute distress.     Appearance: She is well-developed. She is not diaphoretic.   HENT:      Head: Normocephalic and atraumatic.      Right Ear: External ear normal.      Left Ear: External ear normal.      Nose: Nose normal.   Eyes:      Conjunctiva/sclera: Conjunctivae normal.      Pupils: Pupils are equal, round, and reactive to light.   Neck:      Thyroid: No thyromegaly.      Trachea: No tracheal deviation.   Cardiovascular:      Rate and Rhythm: Normal rate and regular rhythm.      Heart sounds: Normal heart sounds. No murmur heard.    No friction rub. No gallop.   Pulmonary:      Effort: Pulmonary effort is normal. No respiratory distress.      Breath sounds: Normal breath sounds.   Abdominal:      General: Bowel sounds are normal.      Palpations: Abdomen is soft. There is no  mass.      Tenderness: There is no abdominal tenderness. There is no guarding.   Musculoskeletal:         General: Normal range of motion.      Cervical back: Normal range of motion and neck supple.   Lymphadenopathy:      Cervical: No cervical adenopathy.   Skin:     General: Skin is warm and dry.      Capillary Refill: Capillary refill takes less than 2 seconds.      Findings: No rash.      Comments: Chest incision clean and healing well.  Left leg ramos graft harvest site healing well with no drainage.   Neurological:      Mental Status: She is alert and oriented to person, place, and time.      Motor: No abnormal muscle tone.      Deep Tendon Reflexes: Reflexes normal.   Psychiatric:         Behavior: Behavior normal.         Thought Content: Thought content normal.         Judgment: Judgment normal.           Assessment & Plan   Diagnoses and all orders for this visit:    1. Hospital discharge follow-up (Primary)    2. S/P CABG (coronary artery bypass graft)    3. Benign essential HTN    4. Hyperlipidemia, unspecified hyperlipidemia type    5. Elevated fasting blood sugar      Discussed the hospitalization and care.  Is doing much better and continue home health and PT.  Follow up with cardiology on 9/30/22 and CTS on 9/21/22 as scheduled.  Discussed the elevated blood sugar and recommend on next blood draw to have an A1c level as she is at risk for developing diabetes.    Recommend high dose flu shot in October and also recommend the prevnar-20          · COVID-19 Precautions - Patient was compliant in wearing a mask. When I saw the patient, I used appropriate personal protective equipment (PPE) including mask and eye shield (standard procedure).  Additionally, I used gown and gloves if indicated.  Hand hygiene was completed before and after seeing the patient.  · Dictated utilizing Dragon Dictation

## 2022-12-19 ENCOUNTER — OFFICE VISIT (OUTPATIENT)
Dept: FAMILY MEDICINE CLINIC | Facility: CLINIC | Age: 77
End: 2022-12-19

## 2022-12-19 VITALS
HEART RATE: 76 BPM | OXYGEN SATURATION: 98 % | WEIGHT: 177.4 LBS | SYSTOLIC BLOOD PRESSURE: 138 MMHG | BODY MASS INDEX: 33.49 KG/M2 | TEMPERATURE: 96.8 F | HEIGHT: 61 IN | DIASTOLIC BLOOD PRESSURE: 74 MMHG | RESPIRATION RATE: 18 BRPM

## 2022-12-19 DIAGNOSIS — E78.5 HYPERLIPIDEMIA, UNSPECIFIED HYPERLIPIDEMIA TYPE: ICD-10-CM

## 2022-12-19 DIAGNOSIS — I10 BENIGN ESSENTIAL HTN: Primary | ICD-10-CM

## 2022-12-19 DIAGNOSIS — S60.221A TRAUMATIC ECCHYMOSIS OF RIGHT HAND, INITIAL ENCOUNTER: ICD-10-CM

## 2022-12-19 DIAGNOSIS — I10 BENIGN ESSENTIAL HTN: ICD-10-CM

## 2022-12-19 DIAGNOSIS — Z11.59 NEED FOR HEPATITIS C SCREENING TEST: ICD-10-CM

## 2022-12-19 DIAGNOSIS — R22.42 LUMP OF SKIN OF LEFT LOWER EXTREMITY: ICD-10-CM

## 2022-12-19 DIAGNOSIS — I25.10 CORONARY ARTERY DISEASE INVOLVING NATIVE CORONARY ARTERY OF NATIVE HEART WITHOUT ANGINA PECTORIS: ICD-10-CM

## 2022-12-19 PROCEDURE — 99214 OFFICE O/P EST MOD 30 MIN: CPT | Performed by: NURSE PRACTITIONER

## 2022-12-19 RX ORDER — LOSARTAN POTASSIUM 100 MG/1
100 TABLET ORAL DAILY
Qty: 90 TABLET | Refills: 1 | Status: SHIPPED | OUTPATIENT
Start: 2022-12-19

## 2022-12-19 RX ORDER — AMLODIPINE BESYLATE 5 MG/1
5 TABLET ORAL DAILY
Qty: 90 TABLET | Refills: 1 | Status: SHIPPED | OUTPATIENT
Start: 2022-12-19

## 2022-12-19 NOTE — PROGRESS NOTES
Subjective   Mayi Hannon is a 77 y.o. female.     Chief Complaint   Patient presents with   • Hypertension       History of Present Illness   Patient presents for follow up HTN: takes Losartan and Amlodipine daily and Carvedilol twice daily; has not been monitoring BP recently; BP had been running 120-130s/70-80s; no headaches; no orthostasis; has had some swelling in legs in last week, but has been eating too many salty snacks; tries to sit with legs elevated when sitting; some walking, but not much recently; has treadmill.    F/U Hyperlipidemia: takes Rosuvastatin every other day and Krill oil daily; had some aching in arms and tenderness in chest after CABG, stopped Rosuvastatin for about 4-6 weeks to allow chest to heal and symptoms resolved; resumed Rosuvastatin daily about 2 weeks ago; has been on several medications for cholesterol in past and did not tolerate or stopped working; fasting today other than coffee with cream/sugar.    F/U CAD: takes ASA daily; recently finished Plavix few days ago since had been on medication for 3 months; had CABG 8/24/22; sees cardiology, Dr. Ferrer, has follow up in early January.    Also c/o tenderness and knot on left medial leg in area of where saphenous vein removed for CABG 3 months ago.    Alos, c/o felt popping sensation on top of right hand when lifting recliner cushion up; had put hands down into crease between cushion and arms of hair to lift up; immediately felt stinging sensation with sharp pain on top of right hand, then resolved; then noted bruising in area; still has some lump in area on right hand, symptoms improved; still has some tenderness, but has improved; had some decreased ROM, but has improved; no OTC treatment.      The following portions of the patient's history were reviewed and updated as appropriate: allergies, current medications, past family history, past medical history, past social history, past surgical history and problem  list.      Current Outpatient Medications   Medication Sig Dispense Refill   • amLODIPine (NORVASC) 5 MG tablet Take 1 tablet by mouth Daily. 90 tablet 1   • aspirin 81 MG EC tablet Take 162 mg by mouth Daily.     • carvedilol (COREG) 25 MG tablet Take 25 mg by mouth 2 (two) times a day.     • cholecalciferol (VITAMIN D3) 25 MCG (1000 UT) tablet Take 1,000 Units by mouth Daily.     • Krill Oil 1000 MG capsule Take 3,000 mg by mouth Daily.     • losartan (COZAAR) 100 MG tablet Take 1 tablet by mouth Daily. 90 tablet 1   • Multiple Vitamin (MULTIVITAMIN) capsule Take 1 capsule by mouth.     • nitroglycerin (NITROSTAT) 0.4 MG SL tablet Place 1 tablet under the tongue Every 5 (Five) Minutes As Needed.     • rosuvastatin (CRESTOR) 5 MG tablet Take 5 mg by mouth Every Other Day.       No current facility-administered medications for this visit.       Past Medical History:   Diagnosis Date   • Coronary artery disease    • GERD (gastroesophageal reflux disease)    • History of chicken pox    • History of MRSA infection 2014    H/O ON MULTIPLE SITES ON BODY (OVER LYMPH NODES)   • Hyperlipidemia    • Hypertension        Past Surgical History:   Procedure Laterality Date   • BILATERAL OOPHORECTOMY     • BREAST EXCISIONAL BIOPSY Left     benign findings   • CHOLECYSTECTOMY     • COLONOSCOPY N/A 12/9/2020    Procedure: COLONOSCOPY;  Surgeon: Page Au MD;  Location: [facility] ENDOSCOPY;  Service: Gastroenterology;  Laterality: N/A;  pre- family hx colon cancer  post-- diverticulosis, hemorrhoids   • CORONARY STENT PLACEMENT  2012    two stents placed   • HYSTERECTOMY      29 years of age       Family History   Problem Relation Age of Onset   • Colon cancer Father    • Heart disease Father    • Ulcers Father    • Other Sister         anaphylactic shock   • Brain cancer Brother    • Stroke Brother    • Heart disease Sister    • Malig Hyperthermia Neg Hx        Social History     Socioeconomic History   • Marital status:  "   Tobacco Use   • Smoking status: Former     Types: Cigarettes     Quit date:      Years since quittin.0   • Smokeless tobacco: Never   Vaping Use   • Vaping Use: Never used   Substance and Sexual Activity   • Alcohol use: Yes     Comment: occ   • Drug use: Never   • Sexual activity: Defer       Review of Systems   Constitutional: Negative for appetite change, chills, fatigue, fever, unexpected weight gain and unexpected weight loss.   HENT: Negative for ear pain and sore throat.    Eyes: Negative for blurred vision.   Respiratory: Negative for cough, chest tightness and shortness of breath.    Cardiovascular: Negative for chest pain and palpitations.   Gastrointestinal: Negative for abdominal pain, blood in stool, constipation (some at times if does not eat enough fiber), diarrhea and GERD. Indigestion: some at times if eats spicy foods, takes Rolaids as needed and helps.   Endocrine: Negative for cold intolerance, heat intolerance and polydipsia.   Genitourinary: Negative for dysuria and frequency.   Musculoskeletal: Negative for back pain. Arthralgias: see HPI.   Skin: Negative for rash.   Neurological: Negative for syncope and weakness.       Objective   Vitals:    22 1319 22 1357   BP: 150/78 138/74   BP Location: Left arm Left arm   Patient Position: Sitting Sitting   Cuff Size: Large Adult    Pulse: 76    Resp: 18    Temp: 96.8 °F (36 °C)    TempSrc: Temporal    SpO2: 98%    Weight: 80.5 kg (177 lb 6.4 oz)    Height: 154.9 cm (61\")      Body mass index is 33.52 kg/m².    Physical Exam  Vitals and nursing note reviewed.   Constitutional:       General: She is not in acute distress.     Appearance: She is well-developed and well-groomed. She is not diaphoretic.       HENT:      Head: Normocephalic.      Right Ear: External ear normal.      Left Ear: External ear normal.   Eyes:      Conjunctiva/sclera: Conjunctivae normal.   Neck:      Vascular: No carotid bruit.   Cardiovascular: "      Rate and Rhythm: Normal rate and regular rhythm.      Pulses: Normal pulses.      Heart sounds: Normal heart sounds. No murmur heard.  Pulmonary:      Effort: Pulmonary effort is normal. No respiratory distress.      Breath sounds: Normal breath sounds.   Abdominal:      General: Bowel sounds are normal.      Palpations: Abdomen is soft. There is no hepatomegaly or splenomegaly.      Tenderness: There is no abdominal tenderness. There is no guarding.   Musculoskeletal:      Cervical back: Normal range of motion and neck supple.      Right lower leg: Right lower leg edema: trace ankle.      Left lower leg: Left lower leg edema: trace ankle.   Skin:     General: Skin is warm and dry.      Findings: No rash.          Neurological:      Mental Status: She is alert and oriented to person, place, and time.      Gait: Gait is intact.   Psychiatric:         Mood and Affect: Mood normal.         Behavior: Behavior normal.         Thought Content: Thought content normal.         Cognition and Memory: Cognition normal.         Judgment: Judgment normal.         Lab Results   Component Value Date    WBC 6.3 08/28/2020    RBC 4.60 08/28/2020    HGB 11.2 (L) 08/24/2022    HCT 33.5 (L) 08/24/2022    MCV 92 08/28/2020    MCH 30.0 08/28/2020    MCHC 32.7 08/28/2020    RDW 12.5 08/28/2020    PLT 92 (L) 08/24/2022    NEUTRORELPCT 54 08/28/2020    LYMPHORELPCT 36 08/28/2020    MONORELPCT 6 08/28/2020    EOSRELPCT 3 08/28/2020    BASORELPCT 1 08/28/2020    NEUTROABS 3.4 08/28/2020    LYMPHSABS 2.3 08/28/2020    MONOSABS 0.4 08/28/2020    EOSABS 0.2 08/28/2020    BASOSABS 0.1 08/28/2020     Lab Results   Component Value Date    GLUCOSE 104 (H) 08/28/2020    BUN 16 08/28/2020    CREATININE 1.04 (H) 08/28/2020    EGFRIFNONA 53 (L) 08/28/2020    EGFRIFAFRI 61 08/28/2020    BCR 15 08/28/2020    K 4.6 08/25/2022    CO2 21 08/28/2020    CALCIUM 10.4 (H) 09/11/2020    PROTENTOTREF 6.8 08/28/2020    ALBUMIN 4.4 08/28/2020    LABIL2 1.8  08/28/2020    AST 33 08/28/2020    ALT 33 (H) 08/28/2020      Lab Results   Component Value Date    CHLPL 257 (H) 08/28/2020    TRIG 280 (H) 08/28/2020    HDL 45 08/28/2020    VLDL 56 (H) 08/28/2020     (H) 08/28/2020     Lab Results   Component Value Date    HGBA1C 5.4 08/16/2022     Lab Results   Component Value Date    COLORU Yellow 08/28/2020    CLARITYU Clear 08/28/2020    LEUKOCYTESUR Negative 08/28/2020    BILIRUBINUR Negative 08/28/2020 8/29/22 CBC WNL except Hgb 10.9, Hct 32.5; BMP WNL except sodium 134, glucose 159; mag 1.7    Assessment    Problem List Items Addressed This Visit     Benign essential HTN - Primary    Current Assessment & Plan     Hypertension is stable.  Regular aerobic exercise.  Continue current medications.  Ambulatory blood pressure monitoring.  Blood pressure will be reassessed at the next regular appointment.  Continue Losartan and Amlodipine daily.  Continue Carvedilol twice daily.  Decrease intake of salt in diet.         Relevant Medications    losartan (COZAAR) 100 MG tablet    amLODIPine (NORVASC) 5 MG tablet    Other Relevant Orders    CBC & Differential    Comprehensive Metabolic Panel    Lipid Panel With LDL / HDL Ratio    Coronary artery disease    Current Assessment & Plan     Continue ASA daily.  Follow up as scheduled with cardiology.         Relevant Medications    amLODIPine (NORVASC) 5 MG tablet    Hyperlipidemia    Current Assessment & Plan     Continue Rosuvastatin daily.  Continue to work on healthy diet and exercise.         Relevant Orders    Comprehensive Metabolic Panel    Lipid Panel With LDL / HDL Ratio    Traumatic ecchymosis of right hand    Current Assessment & Plan     Improving.  Continue to monitor symptoms.         Lump of skin of left lower extremity    Current Assessment & Plan     Some improvement.  Continue to monitor symptoms.        Other Visit Diagnoses     Need for hepatitis C screening test        Relevant Orders    Hepatitis C  Antibody          Patient will get labs drawn when better hydrated since she is hard stick.  Return in about 3 months (around 3/19/2023) for Medicare Wellness, Recheck.or sooner if symptoms persist or worsen.  Pt declines US of LLE lump; pt will check with cardiology at upcoming follow up due to close proximity to incision with retrieval of SVG.  Name and phone number of dermatologist in area given for skin check.       COVID-19 Precautions - Patient was compliant in wearing a mask. When I saw the patient, I used appropriate personal protective equipment (PPE) including mask, gloves, and eye shield (standard procedure).  Hand hygiene was completed before and after seeing the patient.

## 2022-12-19 NOTE — PATIENT INSTRUCTIONS
Continue to monitor your blood pressure periodically and record results.  Continue to work on healthy diet and exercise.  Follow up pending lab results.  Follow up in 3 months for Medicare Wellness, or sooner if symptoms persist or worsen.

## 2022-12-20 PROBLEM — R22.42 LUMP OF SKIN OF LEFT LOWER EXTREMITY: Status: ACTIVE | Noted: 2022-12-20

## 2022-12-20 PROBLEM — S60.221A TRAUMATIC ECCHYMOSIS OF RIGHT HAND: Status: ACTIVE | Noted: 2022-12-20

## 2022-12-20 NOTE — ASSESSMENT & PLAN NOTE
Hypertension is stable.  Regular aerobic exercise.  Continue current medications.  Ambulatory blood pressure monitoring.  Blood pressure will be reassessed at the next regular appointment.  Continue Losartan and Amlodipine daily.  Continue Carvedilol twice daily.  Decrease intake of salt in diet.

## 2022-12-21 DIAGNOSIS — E83.52 HYPERCALCEMIA: ICD-10-CM

## 2022-12-21 DIAGNOSIS — R73.01 ELEVATED FASTING BLOOD SUGAR: Primary | ICD-10-CM

## 2022-12-21 LAB
ALBUMIN SERPL-MCNC: 4.3 G/DL (ref 3.5–5.2)
ALBUMIN/GLOB SERPL: 1.9 G/DL
ALP SERPL-CCNC: 82 U/L (ref 39–117)
ALT SERPL-CCNC: 25 U/L (ref 1–33)
AST SERPL-CCNC: 26 U/L (ref 1–32)
BASOPHILS # BLD AUTO: 0.02 10*3/MM3 (ref 0–0.2)
BASOPHILS NFR BLD AUTO: 0.4 % (ref 0–1.5)
BILIRUB SERPL-MCNC: 0.4 MG/DL (ref 0–1.2)
BUN SERPL-MCNC: 15 MG/DL (ref 8–23)
BUN/CREAT SERPL: 16.7 (ref 7–25)
CALCIUM SERPL-MCNC: 10.6 MG/DL (ref 8.6–10.5)
CHLORIDE SERPL-SCNC: 107 MMOL/L (ref 98–107)
CHOLEST SERPL-MCNC: 154 MG/DL (ref 0–200)
CO2 SERPL-SCNC: 26.4 MMOL/L (ref 22–29)
CREAT SERPL-MCNC: 0.9 MG/DL (ref 0.57–1)
EGFRCR SERPLBLD CKD-EPI 2021: 66 ML/MIN/1.73
EOSINOPHIL # BLD AUTO: 0.21 10*3/MM3 (ref 0–0.4)
EOSINOPHIL NFR BLD AUTO: 3.8 % (ref 0.3–6.2)
ERYTHROCYTE [DISTWIDTH] IN BLOOD BY AUTOMATED COUNT: 12.7 % (ref 12.3–15.4)
GLOBULIN SER CALC-MCNC: 2.3 GM/DL
GLUCOSE SERPL-MCNC: 116 MG/DL (ref 65–99)
HCT VFR BLD AUTO: 38.3 % (ref 34–46.6)
HCV AB S/CO SERPL IA: <0.1 S/CO RATIO (ref 0–0.9)
HDLC SERPL-MCNC: 56 MG/DL (ref 40–60)
HGB BLD-MCNC: 12.7 G/DL (ref 12–15.9)
IMM GRANULOCYTES # BLD AUTO: 0.01 10*3/MM3 (ref 0–0.05)
IMM GRANULOCYTES NFR BLD AUTO: 0.2 % (ref 0–0.5)
LDLC SERPL CALC-MCNC: 68 MG/DL (ref 0–100)
LDLC/HDLC SERPL: 1.1 {RATIO}
LYMPHOCYTES # BLD AUTO: 1.81 10*3/MM3 (ref 0.7–3.1)
LYMPHOCYTES NFR BLD AUTO: 32.8 % (ref 19.6–45.3)
MCH RBC QN AUTO: 31 PG (ref 26.6–33)
MCHC RBC AUTO-ENTMCNC: 33.2 G/DL (ref 31.5–35.7)
MCV RBC AUTO: 93.4 FL (ref 79–97)
MONOCYTES # BLD AUTO: 0.31 10*3/MM3 (ref 0.1–0.9)
MONOCYTES NFR BLD AUTO: 5.6 % (ref 5–12)
NEUTROPHILS # BLD AUTO: 3.15 10*3/MM3 (ref 1.7–7)
NEUTROPHILS NFR BLD AUTO: 57.2 % (ref 42.7–76)
NRBC BLD AUTO-RTO: 0 /100 WBC (ref 0–0.2)
PLATELET # BLD AUTO: 168 10*3/MM3 (ref 140–450)
POTASSIUM SERPL-SCNC: 4.7 MMOL/L (ref 3.5–5.2)
PROT SERPL-MCNC: 6.6 G/DL (ref 6–8.5)
RBC # BLD AUTO: 4.1 10*6/MM3 (ref 3.77–5.28)
SODIUM SERPL-SCNC: 142 MMOL/L (ref 136–145)
TRIGL SERPL-MCNC: 181 MG/DL (ref 0–150)
VLDLC SERPL CALC-MCNC: 30 MG/DL (ref 5–40)
WBC # BLD AUTO: 5.51 10*3/MM3 (ref 3.4–10.8)

## 2022-12-22 DIAGNOSIS — E83.52 HYPERCALCEMIA: ICD-10-CM

## 2022-12-22 DIAGNOSIS — R73.01 ELEVATED FASTING BLOOD SUGAR: ICD-10-CM

## 2023-01-05 LAB
25(OH)D3+25(OH)D2 SERPL-MCNC: 69.2 NG/ML (ref 30–100)
CALCIUM SERPL-MCNC: 10.5 MG/DL (ref 8.7–10.3)
HBA1C MFR BLD: 5.7 % (ref 4.8–5.6)
INTACT PTH: ABNORMAL
PTH-INTACT SERPL-MCNC: 29 PG/ML (ref 15–65)

## 2023-01-06 PROBLEM — R73.03 PREDIABETES: Status: ACTIVE | Noted: 2022-09-19

## 2023-03-03 ENCOUNTER — OFFICE VISIT (OUTPATIENT)
Dept: FAMILY MEDICINE CLINIC | Facility: CLINIC | Age: 78
End: 2023-03-03
Payer: MEDICARE

## 2023-03-03 DIAGNOSIS — J01.90 ACUTE NON-RECURRENT SINUSITIS, UNSPECIFIED LOCATION: Primary | ICD-10-CM

## 2023-03-03 DIAGNOSIS — J20.9 ACUTE BRONCHITIS, UNSPECIFIED ORGANISM: ICD-10-CM

## 2023-03-03 PROBLEM — R73.01 ELEVATED FASTING BLOOD SUGAR: Status: ACTIVE | Noted: 2022-09-19

## 2023-03-03 PROBLEM — Z79.82 LONG TERM (CURRENT) USE OF ASPIRIN: Status: ACTIVE | Noted: 2022-08-30

## 2023-03-03 PROBLEM — Z80.0 FAMILY HISTORY OF MALIGNANT NEOPLASM OF DIGESTIVE ORGANS: Status: ACTIVE | Noted: 2020-08-17

## 2023-03-03 PROBLEM — Z95.5 PRESENCE OF CORONARY ANGIOPLASTY IMPLANT AND GRAFT: Status: ACTIVE | Noted: 2022-08-30

## 2023-03-03 PROCEDURE — 99213 OFFICE O/P EST LOW 20 MIN: CPT | Performed by: NURSE PRACTITIONER

## 2023-03-03 RX ORDER — ASPIRIN 325 MG
325 TABLET, DELAYED RELEASE (ENTERIC COATED) ORAL DAILY
COMMUNITY

## 2023-03-03 RX ORDER — LORATADINE 10 MG/1
10 TABLET ORAL
COMMUNITY

## 2023-03-03 RX ORDER — AMOXICILLIN AND CLAVULANATE POTASSIUM 875; 125 MG/1; MG/1
1 TABLET, FILM COATED ORAL 2 TIMES DAILY
Qty: 14 TABLET | Refills: 0 | Status: SHIPPED | OUTPATIENT
Start: 2023-03-03 | End: 2023-03-10

## 2023-03-03 NOTE — PATIENT INSTRUCTIONS
Increase intake of clear liquids and rest.  Try plain Mucinex to thin secretions.  May try Delsym for cough.  Try nasal steroid, such as Nasacort or Flonase.  Continue Albuterol nebulizer as needed.  Follow up if symptoms persist or worsen.

## 2023-03-03 NOTE — PROGRESS NOTES
Subjective   Mayi Hannon is a 77 y.o. female.     Chief Complaint   Patient presents with   • Cough   • URI     x1 week     You have chosen to receive care through a telehealth visit.  Do you consent to use a video/audio connection for your medical care today? Yes    This was an audio and video enabled telemedicine encounter using [employer] from patient's home in [place], [place] with provider in office in [facility].    History of Present Illness   Patient presents with c/o cough and congestion for over 1 week; has been feeling run down; symptoms started in head and now into lungs; has had productive cough--thick green sputum; some low grade fever; no ear pain, has had sore throat; some SOA; has been using Albuterol nebulizer every 4 hours and helps short term; no wheezing; no nausea, vomiting, or diarrhea; no known exposure to illness; has tried Tylenol or Aleve and help short term; takes Claritin daily; no other OTC treatment.      The following portions of the patient's history were reviewed and updated as appropriate: allergies, current medications, past family history, past medical history, past social history, past surgical history and problem list.    Current Outpatient Medications on File Prior to Visit   Medication Sig   • albuterol (PROVENTIL) (2.5 MG/3ML) 0.083% nebulizer solution Take 2.5 mg by nebulization Every 4 (Four) Hours As Needed for Wheezing.   • amLODIPine (NORVASC) 5 MG tablet Take 1 tablet by mouth Daily.   • aspirin  MG tablet Take 1 tablet by mouth Daily.   • carvedilol (COREG) 25 MG tablet Take 1 tablet by mouth 2 (two) times a day.   • cholecalciferol (VITAMIN D3) 25 MCG (1000 UT) tablet Take 1 tablet by mouth Daily.   • Krill Oil 1000 MG capsule Take 3,000 mg by mouth Daily.   • loratadine (CLARITIN) 10 MG tablet Take 1 tablet by mouth.   • losartan (COZAAR) 100 MG tablet Take 1 tablet by mouth Daily.   • Multiple Vitamin (MULTIVITAMIN) capsule Take 1 capsule by  mouth.   • nitroglycerin (NITROSTAT) 0.4 MG SL tablet Place 1 tablet under the tongue Every 5 (Five) Minutes As Needed.   • rosuvastatin (CRESTOR) 5 MG tablet Take 1 tablet by mouth Every 72 (Seventy-Two) Hours As Needed.     No current facility-administered medications on file prior to visit.        Past Medical History:   Diagnosis Date   • Coronary artery disease    • GERD (gastroesophageal reflux disease)    • History of chicken pox    • History of MRSA infection     H/O ON MULTIPLE SITES ON BODY (OVER LYMPH NODES)   • Hyperlipidemia    • Hypertension        Past Surgical History:   Procedure Laterality Date   • BILATERAL OOPHORECTOMY     • BREAST EXCISIONAL BIOPSY Left     benign findings   • CHOLECYSTECTOMY     • COLONOSCOPY N/A 2020    Procedure: COLONOSCOPY;  Surgeon: Page Au MD;  Location: [facility] ENDOSCOPY;  Service: Gastroenterology;  Laterality: N/A;  pre- family hx colon cancer  post-- diverticulosis, hemorrhoids   • CORONARY STENT PLACEMENT      two stents placed   • HYSTERECTOMY      29 years of age       Family History   Problem Relation Age of Onset   • Colon cancer Father    • Heart disease Father    • Ulcers Father    • Other Sister         anaphylactic shock   • Brain cancer Brother    • Stroke Brother    • Heart disease Sister    • Malig Hyperthermia Neg Hx        Social History     Socioeconomic History   • Marital status:    Tobacco Use   • Smoking status: Former     Types: Cigarettes     Quit date:      Years since quittin.2   • Smokeless tobacco: Never   Vaping Use   • Vaping Use: Never used   Substance and Sexual Activity   • Alcohol use: Yes     Comment: occ   • Drug use: Never   • Sexual activity: Defer       Review of Systems   Constitutional: Positive for fatigue (has malaise) and fever. Negative for unexpected weight gain and unexpected weight loss. Appetite change: some decreased appetite.   HENT: Positive for postnasal drip and sinus pressure.  Negative for trouble swallowing. Rhinorrhea: runny and stuffy nose.    Respiratory: Positive for cough. Chest tightness: some. Shortness of breath: see HPI.    Cardiovascular: Negative for chest pain, palpitations and leg swelling.   Gastrointestinal: Negative for abdominal pain.   Musculoskeletal: Negative for back pain.   Skin: Negative for rash.   Neurological: Negative for dizziness, light-headedness and headache.       Objective   There were no vitals filed for this visit.  There is no height or weight on file to calculate BMI.    Physical Exam  Constitutional:       General: She is not in acute distress.     Appearance: She is well-developed and well-groomed. She is ill-appearing. She is not diaphoretic.   HENT:      Head: Normocephalic.      Nose: Nose normal.      Right Sinus: No maxillary sinus tenderness or frontal sinus tenderness.      Left Sinus: No maxillary sinus tenderness or frontal sinus tenderness (per patient palpation).   Eyes:      Conjunctiva/sclera: Conjunctivae normal.   Pulmonary:      Effort: Pulmonary effort is normal. No accessory muscle usage (mild increased work of breathing at times) or respiratory distress.   Abdominal:      Tenderness: There is no abdominal tenderness (per patient palpation).   Musculoskeletal:      Cervical back: Normal range of motion and neck supple.   Neurological:      Mental Status: She is alert and oriented to person, place, and time.   Psychiatric:         Mood and Affect: Mood normal.         Thought Content: Thought content normal.         Cognition and Memory: Cognition normal.         Judgment: Judgment normal.         Lab Results   Component Value Date    WBC 5.51 12/20/2022    RBC 4.10 12/20/2022    HGB 12.7 12/20/2022    HCT 38.3 12/20/2022    MCV 93.4 12/20/2022    MCH 31.0 12/20/2022    MCHC 33.2 12/20/2022    RDW 12.7 12/20/2022     12/20/2022    NEUTRORELPCT 57.2 12/20/2022    LYMPHORELPCT 32.8 12/20/2022    MONORELPCT 5.6 12/20/2022     EOSRELPCT 3.8 12/20/2022    BASORELPCT 0.4 12/20/2022    NEUTROABS 3.15 12/20/2022    LYMPHSABS 1.81 12/20/2022    MONOSABS 0.31 12/20/2022    EOSABS 0.21 12/20/2022    BASOSABS 0.02 12/20/2022    NRBC 0.0 12/20/2022     Lab Results   Component Value Date    GLUCOSE 116 (H) 12/20/2022    BUN 15 12/20/2022    CREATININE 0.90 12/20/2022    EGFRIFNONA 53 (L) 08/28/2020    EGFRIFAFRI 61 08/28/2020    BCR 16.7 12/20/2022    K 4.7 12/20/2022    CO2 26.4 12/20/2022    CALCIUM 10.5 (H) 01/04/2023    PROTENTOTREF 6.6 12/20/2022    ALBUMIN 4.30 12/20/2022    LABIL2 1.9 12/20/2022    AST 26 12/20/2022    ALT 25 12/20/2022      Lab Results   Component Value Date    CHLPL 154 12/20/2022    TRIG 181 (H) 12/20/2022    HDL 56 12/20/2022    VLDL 30 12/20/2022    LDL 68 12/20/2022     Lab Results   Component Value Date    HGBA1C 5.7 (H) 01/04/2023     Lab Results   Component Value Date    COLORU Yellow 08/28/2020    CLARITYU Clear 08/28/2020    LEUKOCYTESUR Negative 08/28/2020    BILIRUBINUR Negative 08/28/2020 12/20/22 eGFR 66    Assessment    Problem List Items Addressed This Visit     Acute bronchitis    Current Assessment & Plan     Increase intake of clear liquids and rest.  Try plain Mucinex to thin secretions.  May try Delsym for cough.  Continue Albuterol nebulizer as needed.         Relevant Medications    loratadine (CLARITIN) 10 MG tablet    amoxicillin-clavulanate (Augmentin) 875-125 MG per tablet    albuterol (PROVENTIL) (2.5 MG/3ML) 0.083% nebulizer solution    Acute non-recurrent sinusitis - Primary    Current Assessment & Plan     Increase intake of clear liquids and rest.  Try plain Mucinex to thin secretions.  May try Delsym for cough.  Try nasal steroid, such as Nasacort or Flonase.         Relevant Medications    amoxicillin-clavulanate (Augmentin) 875-125 MG per tablet        Return if symptoms worsen or fail to improve.   Will get chest x-ray if symptoms persist or worsen.    Time spent doing video visit,  reviewing, discussing, answering questions, and educating patient was 20 minutes.

## 2023-03-04 PROBLEM — J20.9 ACUTE BRONCHITIS: Status: ACTIVE | Noted: 2023-03-04

## 2023-03-04 PROBLEM — J01.90 ACUTE NON-RECURRENT SINUSITIS: Status: ACTIVE | Noted: 2023-03-04

## 2023-03-04 RX ORDER — ALBUTEROL SULFATE 2.5 MG/3ML
2.5 SOLUTION RESPIRATORY (INHALATION) EVERY 4 HOURS PRN
COMMUNITY

## 2023-03-04 NOTE — ASSESSMENT & PLAN NOTE
Increase intake of clear liquids and rest.  Try plain Mucinex to thin secretions.  May try Delsym for cough.  Continue Albuterol nebulizer as needed.

## 2023-03-04 NOTE — ASSESSMENT & PLAN NOTE
Increase intake of clear liquids and rest.  Try plain Mucinex to thin secretions.  May try Delsym for cough.  Try nasal steroid, such as Nasacort or Flonase.

## 2023-03-20 ENCOUNTER — TELEPHONE (OUTPATIENT)
Dept: FAMILY MEDICINE CLINIC | Facility: CLINIC | Age: 78
End: 2023-03-20

## 2023-03-20 NOTE — TELEPHONE ENCOUNTER
HUB CAN READ AND RESCHEDULE    Patient had appt 3/20/2023.  Provider had a family emergency and needed to reschedule.

## 2023-04-13 ENCOUNTER — OFFICE VISIT (OUTPATIENT)
Dept: FAMILY MEDICINE CLINIC | Facility: CLINIC | Age: 78
End: 2023-04-13
Payer: MEDICARE

## 2023-04-13 VITALS
OXYGEN SATURATION: 97 % | SYSTOLIC BLOOD PRESSURE: 142 MMHG | HEIGHT: 61 IN | HEART RATE: 74 BPM | DIASTOLIC BLOOD PRESSURE: 78 MMHG | WEIGHT: 171 LBS | BODY MASS INDEX: 32.28 KG/M2

## 2023-04-13 DIAGNOSIS — Z00.00 MEDICARE ANNUAL WELLNESS VISIT, SUBSEQUENT: Primary | ICD-10-CM

## 2023-04-13 DIAGNOSIS — I10 BENIGN ESSENTIAL HTN: ICD-10-CM

## 2023-04-13 DIAGNOSIS — Z23 IMMUNIZATION DUE: ICD-10-CM

## 2023-04-13 DIAGNOSIS — R73.03 PREDIABETES: ICD-10-CM

## 2023-04-13 DIAGNOSIS — E78.5 HYPERLIPIDEMIA, UNSPECIFIED HYPERLIPIDEMIA TYPE: ICD-10-CM

## 2023-04-13 DIAGNOSIS — I25.10 CORONARY ARTERY DISEASE INVOLVING NATIVE CORONARY ARTERY OF NATIVE HEART WITHOUT ANGINA PECTORIS: ICD-10-CM

## 2023-04-13 PROBLEM — Z78.9 STATIN INTOLERANCE: Status: ACTIVE | Noted: 2023-03-16

## 2023-04-13 RX ORDER — UBIDECARENONE 200 MG
200 CAPSULE ORAL DAILY
COMMUNITY

## 2023-04-13 RX ORDER — BEMPEDOIC ACID 180 MG/1
180 TABLET, FILM COATED ORAL DAILY
COMMUNITY
Start: 2023-03-15

## 2023-04-13 NOTE — PATIENT INSTRUCTIONS
Medicare Wellness  Personal Prevention Plan of Service     Date of Office Visit:    Encounter Provider:  Rodri Haley MD  Place of Service:  [facility] PRIMARY CARE  Patient Name: Mayi Hannon  :  1945    As part of the Medicare Wellness portion of your visit today, we are providing you with this personalized preventive plan of services (PPPS). This plan is based upon recommendations of the [place] Preventive Services Task Force (USPSTF) and the Advisory Committee on Immunization Practices (ACIP).    This lists the preventive care services that should be considered, and provides dates of when you are due. Items listed as completed are up-to-date and do not require any further intervention.    Health Maintenance   Topic Date Due    DXA SCAN  Never done    TDAP/TD VACCINES (1 - Tdap) Never done    ZOSTER VACCINE (1 of 2) Never done    Pneumococcal Vaccine 65+ (1 - PCV) Never done    INFLUENZA VACCINE  2023    LIPID PANEL  03/15/2024    ANNUAL WELLNESS VISIT  2024    HEPATITIS C SCREENING  Completed    COVID-19 Vaccine  Completed    COLORECTAL CANCER SCREENING  Discontinued       Orders Placed This Encounter   Procedures    Pneumococcal Conjugate Vaccine 20-Valent All       Return in about 6 months (around 10/13/2023) for Next scheduled follow up.

## 2023-04-13 NOTE — PROGRESS NOTES
Subsequent Medicare Wellness Visit   The ABC's of the Annual Wellness Visit    Chief Complaint   Patient presents with   • Medicare Wellness-subsequent   • Hyperlipidemia       HPI:  Mayi Hannon, -1945, is a 77 y.o. female who presents for a Subsequent Medicare Wellness Visit.    Follow-up for hypertension.  Currently, has been feeling well and asymptomatic without any headaches, vision changes, cough, chest pain, shortness of breath, swelling, focal neurologic deficit, memory loss or syncope.  Has been taking the medications regularly and adherent with the regimen amlodipine 5 mg daily, carvedilol 25 mg twice a day, and losartan 100 mg daily.  Denies medication side effects and no significant interval events.      Follow-up for cholesterol.  Currently, has been feeling well without any myalgias, muscle aches, weakness, numbness, chest pain, short of breath or other issues.  Currently, is adherent with medication regimen of nexletol 180 mg and rosuvastatin 5 mg every 3rd day due to statin intolerance and denies medication side effects.     History of prediabetes with last A1c on 23 of 5.7%.  Now trying to follow the diet and loose weight.    Recent Hospitalizations:  Recently treated at the following:  Other: [facility] for coronary artery disease with CABG by Dr. Sims x4 with left internal mammary artery to mid LAD, reverse saphenous vein graft first and second obtuse marginal, and reverse saphenous graft to the posterior descending artery..    Current Medical Providers:  Patient Care Team:  Rodri Haley MD as PCP - General (Internal Medicine)  Giorgio Ferrer MD as Consulting Physician (Cardiology)  Leonardo Sims MD (Cardiothoracic Surgery)    Health Habits and Functional and Cognitive Screening and Depression Screenin/13/2023     4:00 PM   Functional & Cognitive Status   Do you have difficulty preparing food and eating? No   Do you have difficulty bathing yourself,  getting dressed or grooming yourself? No   Do you have difficulty using the toilet? No   Do you have difficulty moving around from place to place? No   Do you have trouble with steps or getting out of a bed or a chair? No   Current Diet Unhealthy Diet   Dental Exam Not up to date   Eye Exam Up to date   Exercise (times per week) 0 times per week   Current Exercises Include No Regular Exercise   Do you need help using the phone?  No   Are you deaf or do you have serious difficulty hearing?  No   Do you need help with transportation? No   Do you need help shopping? No   Do you need help preparing meals?  No   Do you need help with housework?  No   Do you need help with laundry? No   Do you need help taking your medications? No   Do you need help managing money? No   Do you ever drive or ride in a car without wearing a seat belt? No   Have you felt unusual stress, anger or loneliness in the last month? Yes   Who do you live with? Spouse   If you need help, do you have trouble finding someone available to you? No   Have you been bothered in the last four weeks by sexual problems? No   Do you have difficulty concentrating, remembering or making decisions? No     Son diagnosed with cancer but getting worse that is affecting her mood and stress induced.    Compared to one year ago, the patient feels her physical health is the same and her mental health is the same.    Depression Screen:      4/13/2023     4:20 PM   PHQ-2/PHQ-9 Depression Screening   Little Interest or Pleasure in Doing Things 0-->not at all   Feeling Down, Depressed or Hopeless 0-->not at all   PHQ-9: Brief Depression Severity Measure Score 0     Falls Risk Assessment:  VINH Fall Risk Clinician Key Questions   Have you fallen in the past year?: No  Do you feel unsteady with walking?: No  Are you worried about falling?: No    Past Medical/Family/Social History:  The following portions of the patient's history were reviewed and updated as appropriate:  allergies, current medications, past family history, past medical history, past social history, past surgical history and problem list.    Allergies   Allergen Reactions   • Hydrocodone-Acetaminophen Nausea Only   • Statins Myalgia     myalgia   • Niacin Other (See Comments)     Flushing   • Welchol [Colesevelam Hcl] Myalgia   • Ace Inhibitors Cough     cough   • Erythromycin Rash         Current Outpatient Medications:   •  albuterol (PROVENTIL) (2.5 MG/3ML) 0.083% nebulizer solution, Take 2.5 mg by nebulization Every 4 (Four) Hours As Needed for Wheezing., Disp: , Rfl:   •  amLODIPine (NORVASC) 5 MG tablet, Take 1 tablet by mouth Daily., Disp: 90 tablet, Rfl: 1  •  aspirin  MG tablet, Take 1 tablet by mouth Daily., Disp: , Rfl:   •  carvedilol (COREG) 25 MG tablet, Take 1 tablet by mouth 2 (two) times a day., Disp: , Rfl:   •  cholecalciferol (VITAMIN D3) 25 MCG (1000 UT) tablet, Take 1 tablet by mouth Daily., Disp: , Rfl:   •  Krill Oil 1000 MG capsule, Take 3,000 mg by mouth Daily., Disp: , Rfl:   •  loratadine (CLARITIN) 10 MG tablet, Take 1 tablet by mouth., Disp: , Rfl:   •  losartan (COZAAR) 100 MG tablet, Take 1 tablet by mouth Daily., Disp: 90 tablet, Rfl: 1  •  Multiple Vitamin (MULTIVITAMIN) capsule, Take 1 capsule by mouth., Disp: , Rfl:   •  nitroglycerin (NITROSTAT) 0.4 MG SL tablet, Place 1 tablet under the tongue Every 5 (Five) Minutes As Needed., Disp: , Rfl:   •  rosuvastatin (CRESTOR) 5 MG tablet, Take 1 tablet by mouth Every 72 (Seventy-Two) Hours As Needed., Disp: , Rfl:     Aspirin use counseling: Taking ASA appropriately as indicated    Current medication list contains no high risk medications.  No harmful drug interactions have been identified.     Family History   Problem Relation Age of Onset   • Colon cancer Father    • Heart disease Father    • Ulcers Father    • Other Sister         anaphylactic shock   • Brain cancer Brother    • Stroke Brother    • Heart disease Sister    •  Malig Hyperthermia Neg Hx        Social History     Tobacco Use   • Smoking status: Former     Types: Cigarettes     Quit date:      Years since quittin.3   • Smokeless tobacco: Never   Substance Use Topics   • Alcohol use: Yes     Comment: occ       Past Surgical History:   Procedure Laterality Date   • BILATERAL OOPHORECTOMY     • BREAST EXCISIONAL BIOPSY Left     benign findings   • CHOLECYSTECTOMY     • COLONOSCOPY N/A 2020    Procedure: COLONOSCOPY;  Surgeon: Page Au MD;  Location: [facility] ENDOSCOPY;  Service: Gastroenterology;  Laterality: N/A;  pre- family hx colon cancer  post-- diverticulosis, hemorrhoids   • CORONARY STENT PLACEMENT      two stents placed   • HYSTERECTOMY      29 years of age       Patient Active Problem List   Diagnosis   • Benign essential HTN   • Coronary artery disease   • GERD (gastroesophageal reflux disease)   • Hyperlipidemia   • LBBB (left bundle branch block)   • Myocardial infarction   • S/P coronary artery stent placement   • Thyroid disease   • Nipple discharge in female   • Eczema   • Lumbar herniated disc   • Cervical herniated disc   • Colon cancer screening   • Family history of colon cancer in father   • Rash and nonspecific skin eruption   • Urinary frequency   • Family history of malignant neoplasm of colon   • S/P CABG (coronary artery bypass graft)   • Posterior vitreous detachment   • Chorioretinal scar   • Prediabetes   • Traumatic ecchymosis of right hand   • Lump of skin of left lower extremity   • Hypercalcemia   • Elevated fasting blood sugar   • Family history of malignant neoplasm of digestive organs   • Long term (current) use of aspirin   • Presence of coronary angioplasty implant and graft   • Acute bronchitis   • Acute non-recurrent sinusitis       Review of Systems   Constitutional: Negative for activity change, appetite change, fatigue, fever, unexpected weight gain and unexpected weight loss.   HENT: Negative for nosebleeds,  "rhinorrhea, trouble swallowing and voice change.    Eyes: Negative for visual disturbance.   Respiratory: Negative for cough, chest tightness, shortness of breath and wheezing.    Cardiovascular: Negative for chest pain, palpitations and leg swelling.   Gastrointestinal: Negative for abdominal pain, blood in stool, constipation, diarrhea, nausea, vomiting, GERD and indigestion.   Genitourinary: Negative for dysuria, frequency and hematuria.   Musculoskeletal: Negative for arthralgias, back pain and myalgias.        Mild tendon pain in the right hand and proximal arm but full range of motion without any limitations.   Skin: Negative for rash and wound.   Neurological: Negative for dizziness, tremors, weakness, light-headedness, numbness, headache and memory problem.   Hematological: Negative for adenopathy. Does not bruise/bleed easily.   Psychiatric/Behavioral: Negative for sleep disturbance and depressed mood. The patient is not nervous/anxious.        Objective     Vitals:    04/13/23 1619   BP: 146/74   BP Location: Left arm   Patient Position: Sitting   Cuff Size: Large Adult   Pulse: 74   SpO2: 97%   Weight: 77.6 kg (171 lb)   Height: 154.9 cm (61\")       BMI is >= 30 and <35. (Class 1 Obesity). The following options were offered after discussion;: weight loss educational material (shared in after visit summary), exercise counseling/recommendations and nutrition counseling/recommendations    The patient has no evidence of cognitve impairment.     Physical Exam  Vitals and nursing note reviewed.   Constitutional:       General: She is not in acute distress.     Appearance: She is well-developed. She is not diaphoretic.   HENT:      Head: Normocephalic and atraumatic.      Right Ear: External ear normal.      Left Ear: External ear normal.      Nose: Nose normal.   Eyes:      Conjunctiva/sclera: Conjunctivae normal.      Pupils: Pupils are equal, round, and reactive to light.   Neck:      Thyroid: No thyromegaly. "      Trachea: No tracheal deviation.   Cardiovascular:      Rate and Rhythm: Normal rate and regular rhythm.      Heart sounds: Normal heart sounds. No murmur heard.    No friction rub. No gallop.   Pulmonary:      Effort: Pulmonary effort is normal. No respiratory distress.      Breath sounds: Normal breath sounds.   Abdominal:      General: Bowel sounds are normal.      Palpations: Abdomen is soft. There is no mass.      Tenderness: There is no abdominal tenderness. There is no guarding.   Musculoskeletal:         General: Normal range of motion.      Cervical back: Normal range of motion and neck supple.   Lymphadenopathy:      Cervical: No cervical adenopathy.   Skin:     General: Skin is warm and dry.      Capillary Refill: Capillary refill takes less than 2 seconds.      Findings: No rash.   Neurological:      Mental Status: She is alert and oriented to person, place, and time.      Motor: No abnormal muscle tone.      Deep Tendon Reflexes: Reflexes normal.   Psychiatric:         Behavior: Behavior normal.         Thought Content: Thought content normal.         Judgment: Judgment normal.         Recent Lab Results:  Lab Results   Component Value Date     08/24/2022     Lab Results   Component Value Date    TRIG 181 (H) 12/20/2022    HDL 56 12/20/2022    VLDL 30 12/20/2022    LDLHDL 1.10 12/20/2022       Assessment & Plan   Age-appropriate Screening Schedule:  Refer to the list below for future screening recommendations based on patient's age, sex and/or medical conditions.      Health Maintenance   Topic Date Due   • DXA SCAN  Never done   • TDAP/TD VACCINES (1 - Tdap) Never done   • ZOSTER VACCINE (1 of 2) Never done   • Pneumococcal Vaccine 65+ (1 - PCV) Never done   • ANNUAL WELLNESS VISIT  Never done   • INFLUENZA VACCINE  08/01/2023   • LIPID PANEL  03/15/2024   • HEPATITIS C SCREENING  Completed   • COVID-19 Vaccine  Completed   • COLORECTAL CANCER SCREENING  Discontinued       Medicare Risks and  Personalized Health Plan:  Advance Directive Discussion  Fall Risk  Glaucoma Risk  Immunizations Discussed/Encouraged (specific immunizations; Tdap, Prevnar 20 (Pneumococcal 20-valent conjugate) and Shingrix )  Obesity/Overweight     CMS-Preventive Services Quick Reference  Medicare Preventive Services Addressed:  Annual Wellness Visit (AWV)  Glaucoma screening (for individuals with diabetes mellitus, family history of glaucoma, -Americans (> or =) age 50, -Americans (> or =) age 65)    Advance Care Planning:  ACP discussion was held with the patient during this visit. Patient has an advance directive (not in EMR), copy requested.    There are no diagnoses linked to this encounter.  Reviewed history and annual wellness visit with patient during office time.  Medications reviewed as appropriate.  Discussed advanced directives and living will.  Patient has living will: Living will: yes and patient will bring copy to office.  Discussed fall risk and precautions encourage removing throw rugs and using grab bars within the home and bathroom.  Will check the labs as ordered above to evaluate the blood sugars, kidney, liver, cholesterol for screening.  Discussed flu shot recommended to get the high-dose influenza vaccine annually in the fall.  The patient has a COVID HM Topic on their chart, and they are fully vaccinated..  Prevnar-20 up to date given today.  Tdap and Shingrix vaccination series recommended.  Encourage follow-up with the eye doctor on annual basis for glaucoma evaluation.  Discussed weight and encouraged exercise as tolerated while following a healthy diet.  Recommend to get annual mammograms.  Follow-up with specialists as scheduled as well as the lipid clinic with Bobby.      An After Visit Summary and PPPS with all of these plans were given to the patient.      Follow Up:  No follow-ups on file.           · COVID-19 Precautions - Patient was compliant in wearing a mask. When I saw the  patient, I used appropriate personal protective equipment (PPE) including mask and eye shield (standard procedure).  Additionally, I used gown and gloves if indicated.  Hand hygiene was completed before and after seeing the patient.  · Dictated utilizing Dragon Dictation

## 2024-02-07 NOTE — PROGRESS NOTES
Subjective   Mayi Hannon is a 75 y.o. female.     Chief Complaint   Patient presents with   • Urinary Tract Infection     pain in kidney area X 2-3 days    • Rash     patient wants cultured left elbow region        History of Present Illness   Patient of Dr. Haley and is new to me; patient presents with c/o possible UTI; has had pain in bilateral lower back x2 days; no fever; when symptoms started, kept her up that night, could not get comfortable no matter how laid; has been taking aspirin every 6 hours and helped; did not need to take last night, so may be resolved; no pain today; no hematuria; no dysuria; has ongoing urinary frequency; nausea has improved; no vomiting or diarrhea.    Also, c/o rash; mostly breaking out on left inner elbow/arm; few other areas, but wondering if spreading to other areas; rash seems to come and go; blisters come up and then will drain; rash is very itchy, like poison ivy, but no outdoor exposure; rash will come up, drain, then resolve; some scarring with areas if has scratched a lot; rash worse on left upper arm; no one else in household with rash; symptoms started about 3 months ago; tried Triamcinolone cream and helps to dry rash up faster, but did not help rash much; has a lot of allergies; takes Claritin daily; also has food allergies; has tried polyhexamethylene biguanide gauze and does help, but has trouble keeping on areas; wondering if may have problems with kidneys; would like culture of left elbow region.    The following portions of the patient's history were reviewed and updated as appropriate: allergies, current medications, past family history, past medical history, past social history, past surgical history and problem list.    Current Outpatient Medications on File Prior to Visit   Medication Sig   • aspirin 325 MG tablet Take 325 mg by mouth Daily.   • carvedilol (COREG) 25 MG tablet Take 25 mg by mouth 2 (two) times a day.   • loratadine (CLARITIN) 10 MG tablet  Take 10 mg by mouth Daily.   • losartan (COZAAR) 25 MG tablet Take 25 mg by mouth Daily.   • Multiple Vitamin (MULTIVITAMIN) capsule Take 1 capsule by mouth.   • Naproxen Sodium 220 MG capsule Take  by mouth.   • Omega-3 Fatty Acids (FISH OIL) 1000 MG capsule capsule Take 3 capsules by mouth Daily.   • omeprazole (priLOSEC) 20 MG capsule Take 20 mg by mouth Daily.   • triamcinolone (KENALOG) 0.1 % ointment Apply  topically to the appropriate area as directed 3 (Three) Times a Day.     No current facility-administered medications on file prior to visit.        Past Medical History:   Diagnosis Date   • Coronary artery disease    • GERD (gastroesophageal reflux disease)    • History of chicken pox    • History of MRSA infection    • Hyperlipidemia    • Hypertension        Past Surgical History:   Procedure Laterality Date   • BILATERAL OOPHORECTOMY     • BREAST EXCISIONAL BIOPSY Left     benign findings   • CHOLECYSTECTOMY     • CORONARY STENT PLACEMENT      two stents placed   • HYSTERECTOMY      29 years of age       Family History   Problem Relation Age of Onset   • Colon cancer Father    • Heart disease Father    • Ulcers Father    • Other Sister         anaphylactic shock   • Brain cancer Brother    • Stroke Brother    • Heart disease Sister        Social History     Socioeconomic History   • Marital status:      Spouse name: Not on file   • Number of children: Not on file   • Years of education: Not on file   • Highest education level: Not on file   Tobacco Use   • Smoking status: Former Smoker     Last attempt to quit:      Years since quittin.6   • Smokeless tobacco: Never Used   Substance and Sexual Activity   • Alcohol use: Yes     Frequency: Never   • Drug use: Never   • Sexual activity: Defer     Stopped smoking 28 years of age    Review of Systems   Constitutional: Positive for fatigue (when was having back pain ). Negative for unexpected weight gain and unexpected weight loss.  "Appetite change: some decrease.   HENT: Negative for ear pain and sore throat.    Eyes: Blurred vision: no acute change in vision.   Respiratory: Negative for cough, chest tightness and shortness of breath.    Cardiovascular: Negative for chest pain, palpitations and leg swelling.   Gastrointestinal: Positive for nausea. Negative for abdominal pain (some lower abdominal pain when having back pain, resolved), blood in stool and constipation (had some recently, resolved with drinking more water).   Endocrine: Negative for polydipsia.   Genitourinary: Positive for frequency (chronic). Negative for hematuria.   Musculoskeletal: Back pain: no radiation of pain down legs when had back pain.   Skin: Positive for rash.   Neurological: Negative for dizziness and syncope. Headache: some in last 6 months, brief.   Hematological: Does not bruise/bleed easily.       Objective   Vitals:    08/28/20 1135   BP: 144/72   BP Location: Right arm   Patient Position: Sitting   Cuff Size: Adult   Pulse: 73   Temp: 98.3 °F (36.8 °C)   SpO2: 96%   Weight: 85.8 kg (189 lb 3.2 oz)   Height: 154.9 cm (61\")     Body mass index is 35.75 kg/m².    Physical Exam   Constitutional: She is oriented to person, place, and time. She appears well-developed and well-nourished. No distress.   HENT:   Head: Normocephalic.   Eyes: Conjunctivae are normal.   Neck: Normal range of motion. Neck supple. Carotid bruit is not present.   Cardiovascular: Normal rate, regular rhythm, normal heart sounds and intact distal pulses.   No murmur heard.  Pulmonary/Chest: Effort normal and breath sounds normal. No respiratory distress.   Abdominal: Soft. Bowel sounds are normal. She exhibits no mass. There is no hepatosplenomegaly. There is no tenderness. There is no rigidity, no rebound, no guarding and no CVA tenderness.   Musculoskeletal: She exhibits no edema.        Cervical back: She exhibits no bony tenderness.        Thoracic back: She exhibits no bony tenderness. "        Lumbar back: She exhibits no bony tenderness.   Neurological: She is alert and oriented to person, place, and time. Coordination and gait normal.   Skin: Skin is warm and dry. She is not diaphoretic.        Psychiatric: She has a normal mood and affect. Judgment and thought content normal. Cognition and memory are normal.   Nursing note and vitals reviewed.    Fluid filled blister on left arm prepped with alcohol swab prior to puncture with needle to get culture; minimal clear fluid drained; site covered with band-aid.      Lab Results   Component Value Date    CHLPL 267 (H) 09/05/2019    TRIG 289 (H) 09/05/2019    HDL 42 (L) 09/05/2019    VLDL 58 (H) 09/05/2019     (H) 09/05/2019         Assessment/Plan .  Problem List Items Addressed This Visit     Hyperlipidemia    Relevant Orders    Lipid Panel With LDL / HDL Ratio    Rash and nonspecific skin eruption - Primary    Current Assessment & Plan     Make sure drinking adequate liquids.  Keep diary of rash symptoms.         Relevant Orders    CBC & Differential    Anaerobic Culture - Swab, Arm    Culture, Routine - Swab, Arm, Left    Ambulatory Referral to Dermatology    Urinary frequency    Relevant Orders    Comprehensive Metabolic Panel    POC Urinalysis Dipstick, Automated (Completed)    Urine Culture - Urine, Urine, Clean Catch          Return if symptoms worsen or fail to improve.   Patient has not had recent lipid; will check lipid with labs today.          Post-Care Instructions: I reviewed with the patient in detail post-care instructions. Patient is to wear sunprotection, and avoid picking at any of the treated lesions. Pt may apply Vaseline to crusted or scabbing areas. Duration Of Freeze Thaw-Cycle (Seconds): 5 Render Note In Bullet Format When Appropriate: No Show Applicator Variable?: Yes Number Of Freeze-Thaw Cycles: 3 freeze-thaw cycles Detail Level: Detailed Consent: The patient's consent was obtained including but not limited to risks of crusting, scabbing, blistering, scarring, darker or lighter pigmentary change, recurrence, incomplete removal and infection.

## 2025-04-07 ENCOUNTER — OFFICE VISIT (OUTPATIENT)
Dept: FAMILY MEDICINE CLINIC | Facility: CLINIC | Age: 80
End: 2025-04-07
Payer: MEDICARE

## 2025-04-07 VITALS
HEIGHT: 61 IN | WEIGHT: 175.8 LBS | OXYGEN SATURATION: 93 % | TEMPERATURE: 96.9 F | BODY MASS INDEX: 33.19 KG/M2 | DIASTOLIC BLOOD PRESSURE: 64 MMHG | HEART RATE: 70 BPM | SYSTOLIC BLOOD PRESSURE: 118 MMHG

## 2025-04-07 DIAGNOSIS — R06.02 SHORTNESS OF BREATH: Primary | ICD-10-CM

## 2025-04-07 DIAGNOSIS — R53.83 FATIGUE, UNSPECIFIED TYPE: ICD-10-CM

## 2025-04-07 DIAGNOSIS — I10 BENIGN ESSENTIAL HTN: ICD-10-CM

## 2025-04-07 PROCEDURE — 1159F MED LIST DOCD IN RCRD: CPT | Performed by: NURSE PRACTITIONER

## 2025-04-07 PROCEDURE — 93000 ELECTROCARDIOGRAM COMPLETE: CPT | Performed by: NURSE PRACTITIONER

## 2025-04-07 PROCEDURE — 1126F AMNT PAIN NOTED NONE PRSNT: CPT | Performed by: NURSE PRACTITIONER

## 2025-04-07 PROCEDURE — 3074F SYST BP LT 130 MM HG: CPT | Performed by: NURSE PRACTITIONER

## 2025-04-07 PROCEDURE — 99214 OFFICE O/P EST MOD 30 MIN: CPT | Performed by: NURSE PRACTITIONER

## 2025-04-07 PROCEDURE — 1160F RVW MEDS BY RX/DR IN RCRD: CPT | Performed by: NURSE PRACTITIONER

## 2025-04-07 PROCEDURE — 3078F DIAST BP <80 MM HG: CPT | Performed by: NURSE PRACTITIONER

## 2025-04-07 NOTE — PROGRESS NOTES
Subjective   Mayi Hannon is a 79 y.o. female.     Chief Complaint   Patient presents with    Fatigue    Shortness of Breath       History of Present Illness   Patient presents with c/o fatigue and shortness of breath; has had symptoms since had norovirus 3 weeks ago; was not seen anywhere to diagnose, pt felt like symptoms matched diagnosis; no more vomiting or diarrhea; rare nonproductive cough; previously had SOA only with activity, but has felt SOA at rest and with activity since yesterday; feels like cannot take deep breath; no history of asthma; no chest pain; has sensation of heaviness in upper abdomen; no fever, woke up during night sweating 2-3 times over last week; no ear pain; had some sore throat last week, but resolved after couple of hours; no blood in BM or dark BM; no abdominal pain; has been using Albuterol nebulizer and helps a little.    F/U HTN: takes Amlodipine and Losartan daily and Carvedilol twice daily; has not been monitoring BP recently since had been stable; no headaches for most part, had mild headache x1 hour yesterday, resolved with rest; no orthostasis unless gets up too fast; no swelling as long as elevates legs when sitting; sees cardiology; history of CABG; last saw Dr. Ferrer 12/2023 and to follow up 12/2024, but had to reschedule appointment; has appointment with Dr. Ferrer in August.    F/U Hyperlipidemia: no longer taking Rosuvastatin; has tried multiple medications (Nexletol, statins, Zetia, etc) for cholesterol, but does not tolerate; last labs 1/2024 per Dr. Ferrer cardiology.       was present during the history-taking and subsequent discussion with this patient.  Patient agrees to the presence of the individual during this visit.     The following portions of the patient's history were reviewed and updated as appropriate: allergies, current medications, past family history, past medical history, past social history, past surgical history and problem  list.    Current Outpatient Medications on File Prior to Visit   Medication Sig    albuterol (PROVENTIL) (2.5 MG/3ML) 0.083% nebulizer solution Take 2.5 mg by nebulization Every 4 (Four) Hours As Needed for Wheezing.    amLODIPine (NORVASC) 5 MG tablet Take 1 tablet by mouth Daily.    aspirin  MG tablet Take 1 tablet by mouth Daily.    carvedilol (COREG) 25 MG tablet Take 1 tablet by mouth 2 (two) times a day.    cholecalciferol (VITAMIN D3) 25 MCG (1000 UT) tablet Take 1 tablet by mouth Daily.    loratadine (CLARITIN) 10 MG tablet Take 1 tablet by mouth.    losartan (COZAAR) 100 MG tablet Take 1 tablet by mouth Daily.    Multiple Vitamin (MULTIVITAMIN) capsule Take 1 capsule by mouth.    Multiple Vitamins-Minerals (ZINC PO) Take  by mouth. Patient only takes when she is not feeling well    nitroglycerin (NITROSTAT) 0.4 MG SL tablet Place 1 tablet under the tongue Every 5 (Five) Minutes As Needed.    Probiotic Product (PROBIOTIC PO) Take  by mouth. Patient takes a probiotic every other day    [DISCONTINUED] Chromium Picolinate 500 MCG tablet Take  by mouth.    [DISCONTINUED] Bempedoic Acid (Nexletol) 180 MG tablet Take 1 tablet by mouth Daily.    [DISCONTINUED] Coenzyme Q10 200 MG capsule Take 200 mg by mouth Daily.    [DISCONTINUED] Krill Oil 1000 MG capsule Take 3,000 mg by mouth Daily.    [DISCONTINUED] rosuvastatin (CRESTOR) 5 MG tablet Take 1 tablet by mouth Every 72 (Seventy-Two) Hours As Needed.     No current facility-administered medications on file prior to visit.        Past Medical History:   Diagnosis Date    Coronary artery disease     GERD (gastroesophageal reflux disease)     History of chicken pox     History of MRSA infection 2014    H/O ON MULTIPLE SITES ON BODY (OVER LYMPH NODES)    Hyperlipidemia     Hypertension        Past Surgical History:   Procedure Laterality Date    BILATERAL OOPHORECTOMY      BREAST EXCISIONAL BIOPSY Left     benign findings    CHOLECYSTECTOMY      COLONOSCOPY N/A  2020    Procedure: COLONOSCOPY;  Surgeon: Page Au MD;  Location: [facility] ENDOSCOPY;  Service: Gastroenterology;  Laterality: N/A;  pre- family hx colon cancer  post-- diverticulosis, hemorrhoids    CORONARY STENT PLACEMENT      two stents placed    HYSTERECTOMY      29 years of age       Family History   Problem Relation Age of Onset    Colon cancer Father     Heart disease Father     Ulcers Father     Other Sister         anaphylactic shock    Brain cancer Brother     Stroke Brother     Heart disease Sister     Malig Hyperthermia Neg Hx        Social History     Socioeconomic History    Marital status:    Tobacco Use    Smoking status: Former     Current packs/day: 0.00     Types: Cigarettes     Quit date:      Years since quittin.2    Smokeless tobacco: Never   Vaping Use    Vaping status: Never Used   Substance and Sexual Activity    Alcohol use: Yes     Comment: occ    Drug use: Never    Sexual activity: Defer       Review of Systems   Constitutional:  Positive for appetite change (has had decreased appetite for last couple of weeks) and fatigue. Negative for chills, fever (see HPI), unexpected weight gain and unexpected weight loss.   HENT:  Negative for postnasal drip, sinus pressure and trouble swallowing. Rhinorrhea: runny/stuffy nose.   Respiratory:  Positive for chest tightness and shortness of breath. Cough: see HPI.   Cardiovascular:  Negative for chest pain (see HPI) and palpitations.   Gastrointestinal:  Negative for abdominal pain, GERD and indigestion (only if eats spicy foods, no longer taking medication for HAJA and has been stable). Abdominal distention: some; no early satiety.  Endocrine: Negative for polydipsia.   Genitourinary:  Positive for urinary incontinence (chronic). Negative for dysuria and frequency. Decreased urine volume: some decrease in urine output since was sick; has not been drinking enough liquids; last urinated this morning.  Musculoskeletal:   "Negative for back pain.   Skin:  Negative for rash.   Neurological:  Negative for syncope. Weakness: some in general.  Psychiatric/Behavioral:  Sleep disturbance: has had trouble sleeping.        Objective   Vitals:    04/07/25 1412   BP: 118/64   BP Location: Left arm   Patient Position: Sitting   Cuff Size: Large Adult   Pulse: 70   Temp: 96.9 °F (36.1 °C)   TempSrc: Temporal   SpO2: 93%   Weight: 79.7 kg (175 lb 12.8 oz)   Height: 154.9 cm (61\")     Body mass index is 33.22 kg/m².    Physical Exam  Vitals and nursing note reviewed.   Constitutional:       General: She is not in acute distress.     Appearance: She is well-developed and well-groomed. She is not diaphoretic. Ill appearance: very tired.  HENT:      Head: Normocephalic.      Right Ear: External ear normal. No decreased hearing noted. Right ear middle ear effusion: mild. Tympanic membrane is not erythematous.      Left Ear: External ear normal. No decreased hearing noted. Left ear middle ear effusion: TM dull. Tympanic membrane is not erythematous.      Nose: Nose normal.      Right Sinus: No maxillary sinus tenderness or frontal sinus tenderness.      Left Sinus: No maxillary sinus tenderness or frontal sinus tenderness.      Mouth/Throat:      Mouth: Mucous membranes are moist.      Pharynx: No oropharyngeal exudate. Posterior oropharyngeal erythema: mild.  Eyes:      Conjunctiva/sclera: Conjunctivae normal.   Neck:      Vascular: No carotid bruit.   Cardiovascular:      Rate and Rhythm: Normal rate and regular rhythm.      Pulses: Normal pulses.      Heart sounds: Normal heart sounds.   Pulmonary:      Effort: Pulmonary effort is normal. No respiratory distress. Accessory muscle usage: some at times.     Breath sounds: Examination of the left-upper field reveals rales. Examination of the right-lower field reveals rales. Examination of the left-lower field reveals rales. Rales present.   Abdominal:      General: Bowel sounds are normal.      " Palpations: Abdomen is soft. There is no hepatomegaly or splenomegaly.      Tenderness: There is abdominal tenderness in the right upper quadrant. There is no guarding or rebound.   Musculoskeletal:      Cervical back: Normal range of motion and neck supple.      Right lower leg: No edema.      Left lower leg: No edema.   Lymphadenopathy:      Cervical: No cervical adenopathy.   Skin:     General: Skin is warm and dry.      Findings: No rash.   Neurological:      Mental Status: She is alert and oriented to person, place, and time.      Cranial Nerves: Cranial nerves 2-12 are intact.      Motor: Weakness (in general) present.      Gait: Abnormal gait: guarded gait.   Psychiatric:         Mood and Affect: Mood normal.         Behavior: Behavior normal.         Thought Content: Thought content normal.         Cognition and Memory: Cognition normal.         Judgment: Judgment normal.             ECG 12 Lead    Date/Time: 4/7/2025 2:38 PM  Performed by: Afia Connell APRN    Authorized by: Afia Connell APRN  Comparison: compared with previous ECG from 1/20/2023  Comparison to previous ECG: Unable to see ECG tracing; previous cardio note noted sinus rhythm with left BBB  Conduction: left bundle branch block and 1st degree AV block    Clinical impression: abnormal EKG         Lab Results   Component Value Date    WBC 5.51 12/20/2022    RBC 4.10 12/20/2022    HGB 12.7 12/20/2022    HCT 38.3 12/20/2022    MCV 93.4 12/20/2022    MCH 31.0 12/20/2022    MCHC 33.2 12/20/2022    RDW 12.7 12/20/2022    MPV 10.1 08/29/2022     12/20/2022    NEUTRORELPCT 57.2 12/20/2022    LYMPHORELPCT 32.8 12/20/2022    MONORELPCT 5.6 12/20/2022    EOSRELPCT 3.8 12/20/2022    BASORELPCT 0.4 12/20/2022    AUTOIGPER 0.5 08/29/2022    NEUTROABS 3.15 12/20/2022    LYMPHSABS 1.81 12/20/2022    MONOSABS 0.31 12/20/2022    EOSABS 0.21 12/20/2022    BASOSABS 0.02 12/20/2022    AUTOIGNUM 0.04 08/29/2022    NRBC 0.0 12/20/2022     Lab Results    Component Value Date    GLUCOSE 116 (H) 12/20/2022    BUN 15 12/20/2022    CREATININE 0.90 12/20/2022    EGFRIFNONA 53 (L) 08/28/2020    EGFRIFAFRI >60 08/29/2022    BCR 16.7 12/20/2022    K 4.7 12/20/2022    CO2 26.4 12/20/2022    CALCIUM 10.5 (H) 01/04/2023    ALBUMIN 4.30 12/20/2022    LABIL2 2.4 08/24/2022    AST 26 12/20/2022    ALT 25 12/20/2022      Lab Results   Component Value Date    CHLPL 154 12/20/2022    TRIG 181 (H) 12/20/2022    HDL 56 12/20/2022    VLDL 30 12/20/2022    LDL 68 12/20/2022     Lab Results   Component Value Date    HGBA1C 5.7 (H) 01/04/2023     Lab Results   Component Value Date    COLORU Yellow 08/28/2020    CLARITYU Clear 08/28/2020    LEUKOCYTESUR Negative 08/28/2020    BILIRUBINUR Negative 08/28/2020          Assessment    Problem List Items Addressed This Visit       Benign essential HTN    Current Assessment & Plan   Hypertension is stable and controlled  Continue current treatment regimen.  Ambulatory blood pressure monitoring.  Blood pressure will be reassessed  at next office visit .  Continue Amlodipine and Losartan daily and Carvedilol twice daily.  Follow up as scheduled with cardiology.         Fatigue    Shortness of breath - Primary    Relevant Orders    ECG 12 Lead            Return if symptoms worsen or fail to improve.  Instructed to go to ER for further evaluation due to shortness of breath, weakness, and fatigue; pt denies chest pain and previous ECG also noted left bundle branch block; VSS with HR 65 and O2 sat 93%; spouse will take patient to ER for further evaluation.         COVID-19 Precautions - Patient was compliant in wearing a mask. When I saw the patient, I used appropriate personal protective equipment (PPE) including mask, gloves, and eye shield (standard procedure).  Hand hygiene was completed before and after seeing the patient.

## 2025-04-07 NOTE — ASSESSMENT & PLAN NOTE
Hypertension is stable and controlled  Continue current treatment regimen.  Ambulatory blood pressure monitoring.  Blood pressure will be reassessed  at next office visit .  Continue Amlodipine and Losartan daily and Carvedilol twice daily.  Follow up as scheduled with cardiology.

## 2025-04-10 ENCOUNTER — TELEPHONE (OUTPATIENT)
Dept: FAMILY MEDICINE CLINIC | Facility: CLINIC | Age: 80
End: 2025-04-10
Payer: MEDICARE

## 2025-04-10 ENCOUNTER — READMISSION MANAGEMENT (OUTPATIENT)
Dept: CALL CENTER | Facility: HOSPITAL | Age: 80
End: 2025-04-10
Payer: MEDICARE

## 2025-04-10 NOTE — OUTREACH NOTE
Prep Survey      Flowsheet Row Responses   [facility] facility patient discharged from? Non-[facility]   Is LACE score < 7 ? Non-BH Discharge   [facility]lig[facility]   Date of Admission 04/07/25   Date of Discharge 04/10/25   Discharge Disposition Home or Self Care   Discharge diagnosis Elevated troponin   Does the patient have one of the following disease processes/diagnoses(primary or secondary)? Other   Does the patient have Home health ordered? No   Is there a DME ordered? No   Prep survey completed? Yes            DEE DEVINE - Registered Nurse

## 2025-04-10 NOTE — TELEPHONE ENCOUNTER
Caller: MIKE NOEL    Relationship to patient:     Best call back number: [phone]     Chief complaint: HOSPITAL FOLLOW UP; CHF; [facility]; DISCHARGED 4/10/25    Type of visit: HOSPITAL FOLLOWUP    Additional notes:UNABLE TO WARM TRANSFER TO SCHEDULE.    PLEASE CALL PATIENT TO SCHEDULE -[phone]

## 2025-04-11 ENCOUNTER — TRANSITIONAL CARE MANAGEMENT TELEPHONE ENCOUNTER (OUTPATIENT)
Dept: CALL CENTER | Facility: HOSPITAL | Age: 80
End: 2025-04-11
Payer: MEDICARE

## 2025-04-11 NOTE — OUTREACH NOTE
Call Center TCM Note      Flowsheet Row Responses   [facility] patient discharged from? Non-   Does the patient have one of the following disease processes/diagnoses(primary or secondary)? Other   TCM attempt successful? Yes   Call start time 1103   Call end time 1106   List who call center can speak with pt   Meds reviewed with patient/caregiver? Yes   Is the patient having any side effects they believe may be caused by any medication additions or changes? No   Does the patient have all medications ordered at discharge? Yes   Is the patient taking all medications as directed (includes completed medication regime)? Yes   Comments Pt prefers to call pcp office for hospital f/u appointment.   Does the patient have an appointment with their PCP within 7-14 days of discharge? No   Nursing Interventions Routed TCM call to PCP office   Has home health visited the patient within 72 hours of discharge? N/A   Psychosocial issues? No   Did the patient receive a copy of their discharge instructions? Yes   Nursing interventions Reviewed instructions with patient   What is the patient's perception of their health status since discharge? Improving   Is the patient/caregiver able to teach back signs and symptoms related to disease process for when to call PCP? Yes   Is the patient/caregiver able to teach back signs and symptoms related to disease process for when to call 911? Yes   Is the patient/caregiver able to teach back the hierarchy of who to call/visit for symptoms/problems? PCP, Specialist, Home health nurse, Urgent Care, ED, 911 Yes   TCM call completed? Yes   Wrap up additional comments Pt reports much improvement. Pt has all medications.   Call end time 1106   Would this patient benefit from a Referral to Amb Social Work? No   Is the patient interested in additional calls from an ambulatory ? No            Praveena TANNER - Registered Nurse    4/11/2025, 11:08 EDT

## 2025-04-18 ENCOUNTER — OFFICE VISIT (OUTPATIENT)
Dept: FAMILY MEDICINE CLINIC | Facility: CLINIC | Age: 80
End: 2025-04-18
Payer: MEDICARE

## 2025-04-18 VITALS
HEART RATE: 62 BPM | DIASTOLIC BLOOD PRESSURE: 58 MMHG | SYSTOLIC BLOOD PRESSURE: 120 MMHG | TEMPERATURE: 98 F | HEIGHT: 61 IN | BODY MASS INDEX: 31.91 KG/M2 | OXYGEN SATURATION: 96 % | WEIGHT: 169 LBS

## 2025-04-18 DIAGNOSIS — D64.9 ANEMIA, UNSPECIFIED TYPE: ICD-10-CM

## 2025-04-18 DIAGNOSIS — R73.03 PREDIABETES: ICD-10-CM

## 2025-04-18 DIAGNOSIS — Z09 HOSPITAL DISCHARGE FOLLOW-UP: Primary | ICD-10-CM

## 2025-04-18 DIAGNOSIS — I10 PRIMARY HYPERTENSION: ICD-10-CM

## 2025-04-18 DIAGNOSIS — R53.83 FATIGUE, UNSPECIFIED TYPE: ICD-10-CM

## 2025-04-18 DIAGNOSIS — I25.10 CORONARY ARTERY DISEASE INVOLVING NATIVE CORONARY ARTERY OF NATIVE HEART WITHOUT ANGINA PECTORIS: ICD-10-CM

## 2025-04-18 DIAGNOSIS — E78.2 MIXED HYPERLIPIDEMIA: ICD-10-CM

## 2025-04-18 DIAGNOSIS — Z78.9 STATIN INTOLERANCE: ICD-10-CM

## 2025-04-18 RX ORDER — FUROSEMIDE 40 MG/1
40 TABLET ORAL DAILY
COMMUNITY
Start: 2025-04-11 | End: 2025-05-11

## 2025-04-18 NOTE — PROGRESS NOTES
Subjective   Mayi Hannon is a 79 y.o. female.     Chief Complaint   Patient presents with    Hospital Follow Up Visit     On 4/7/2025 she went to [facility] for shortness of breath and they said she had fluid on her lungs. Since being out she feels better but is still tired and trying to get her energy back       History of Present Illness  Patient presents for hospital discharge follow up; patient went to [facility] on 4/7/25 with c/o SOA; cardiology consulted; diagnosed with acute diastolic CHF, elevated troponin likely nonischemic, nontraumatic elevation in the setting of CHF; chest x-ray noted small bilateral pleural effusions; had Echo and showed EF 59% with an inferior wall motion abnormality consistent with previous infarct, mild MR; given IV Lasix and discharged home with PO Lasix 40 mg daily on 4/10/25; recommended to consider SGLT-2 outpatient; pt declines SGLT-2; has follow up with cardiology on 4/24/25.    Overall, feels better than when went to hospital, but still having some fatigue; breathing is much better.    F/U HTN: takes Losartan, Amlodipine, and Furosemide daily and Coreg twice daily; plans to start monitoring BP; has felt tired since home from hospital; plans to start walking around house to be more active; no headaches; no orthostasis; mild swelling in ankles and feet with feet dependent; tries to elevate feet when sitting.    F/U CAD: takes daily aspirin, no longer taking Imdur; sees cardiology Dr. Ferrer.    F/U Hyperlipidemia: intolerant of statins, has also tried Nexletol and Zetia; PCSK9 inhibitor unaffordable; trying to do better watching saturated fats in diet.    Has not needed to use Albuterol nebulizer since has been home from hospital.     was present during the history-taking and subsequent discussion with this patient.  Patient agrees to the presence of the individual during this visit.      Within 48 business hours  after discharge our office contacted her via telephone to coordinate her care and needs.        4/10/2025     4:37 PM   Date of TCM Phone Call   [facility]   Date of Admission 4/7/2025   Date of Discharge 4/10/2025   Discharge Disposition Home or Self Care     Risk for Readmission (LACE) No data recorded    Patient was admitted to [facility]   ALL records were obtained and reviewed.  Date of admission 4/7/25  Date of discharge 4/10/25  Diagnosis:  Acute diastolic CHF; CAD; Nonischemic nontraumatic myocardial injury; Hyperlipidemia; HTN; S/P coronary artery stent placement; S/P CABG; Obesity; Statin intolerance  Medications upon discharge: Reviewed and reconciled  Condition stable    Current outpatient and discharge medications have been reconciled for the patient.    I reviewed and requested records labs and diagnostics from the hospital with the patient and family.  The patient is to follow-up with specialist as discussed and directed.  If any problems arise or further questions develop patient is to call or to contact us for any needs.     The following portions of the patient's history were reviewed and updated as appropriate: allergies, current medications, past family history, past medical history, past social history, past surgical history and problem list.    Current Outpatient Medications   Medication Sig Dispense Refill    albuterol (PROVENTIL) (2.5 MG/3ML) 0.083% nebulizer solution Take 2.5 mg by nebulization Every 4 (Four) Hours As Needed for Wheezing.      amLODIPine (NORVASC) 5 MG tablet Take 1 tablet by mouth Daily. 90 tablet 1    aspirin  MG tablet Take 1 tablet by mouth Daily.      carvedilol (COREG) 25 MG tablet Take 1 tablet by mouth 2 (two) times a day.      cholecalciferol (VITAMIN D3) 25 MCG (1000 UT) tablet Take 1 tablet by mouth Daily.      furosemide (LASIX) 40 MG tablet Take 1 tablet by mouth Daily.      loratadine (CLARITIN) 10 MG tablet Take 1 tablet by  mouth.      losartan (COZAAR) 100 MG tablet Take 1 tablet by mouth Daily. 90 tablet 1    Multiple Vitamin (MULTIVITAMIN) capsule Take 1 capsule by mouth.      Multiple Vitamins-Minerals (ZINC PO) Take  by mouth. Patient only takes when she is not feeling well      nitroglycerin (NITROSTAT) 0.4 MG SL tablet Place 1 tablet under the tongue Every 5 (Five) Minutes As Needed.      Probiotic Product (PROBIOTIC PO) Take  by mouth. Patient takes a probiotic every other day       No current facility-administered medications for this visit.       Past Medical History:   Diagnosis Date    Coronary artery disease     GERD (gastroesophageal reflux disease)     History of chicken pox     History of MRSA infection     H/O ON MULTIPLE SITES ON BODY (OVER LYMPH NODES)    Hyperlipidemia     Hypertension        Past Surgical History:   Procedure Laterality Date    BILATERAL OOPHORECTOMY      BREAST EXCISIONAL BIOPSY Left     benign findings    CHOLECYSTECTOMY      COLONOSCOPY N/A 2020    Procedure: COLONOSCOPY;  Surgeon: Page Au MD;  Location: [facility] ENDOSCOPY;  Service: Gastroenterology;  Laterality: N/A;  pre- family hx colon cancer  post-- diverticulosis, hemorrhoids    CORONARY STENT PLACEMENT      two stents placed    HYSTERECTOMY      29 years of age       Family History   Problem Relation Age of Onset    Colon cancer Father     Heart disease Father     Ulcers Father     Other Sister         anaphylactic shock    Brain cancer Brother     Stroke Brother     Heart disease Sister     Malig Hyperthermia Neg Hx        Social History     Socioeconomic History    Marital status:    Tobacco Use    Smoking status: Former     Current packs/day: 0.00     Types: Cigarettes     Quit date:      Years since quittin.3    Smokeless tobacco: Never   Vaping Use    Vaping status: Never Used   Substance and Sexual Activity    Alcohol use: Yes     Comment: occ    Drug use: Never    Sexual activity: Defer  "      Review of Systems   Constitutional:  Positive for fatigue. Negative for chills, fever, unexpected weight gain and unexpected weight loss. Appetite change: some decrease in appetite.  HENT:  Negative for ear pain, sore throat and trouble swallowing. Postnasal drip: feels like has congestion in throat. Sinus pressure: some.   Eyes:  Negative for blurred vision.   Respiratory:  Negative for cough, chest tightness and shortness of breath.    Cardiovascular:  Negative for chest pain and palpitations.   Gastrointestinal:  Negative for abdominal pain, blood in stool and diarrhea. Constipation: some when first home from hospital; takes stool softener as needed and helps; probiotic every other day works well.  Endocrine: Negative for polydipsia (has been trying to drink more water).   Genitourinary:  Positive for frequency (some with Furosemide). Negative for dysuria.   Skin:  Negative for rash.   Neurological:  Negative for syncope and weakness.       Objective   Vitals:    04/18/25 1327   BP: 120/58   BP Location: Left arm   Patient Position: Sitting   Cuff Size: Large Adult   Pulse: 62   Temp: 98 °F (36.7 °C)   TempSrc: Temporal   SpO2: 96%   Weight: 76.7 kg (169 lb)   Height: 154.9 cm (60.98\")     Body mass index is 31.95 kg/m².    Physical Exam  Vitals and nursing note reviewed.   Constitutional:       General: She is not in acute distress.     Appearance: She is well-developed and well-groomed. She is not diaphoretic.   HENT:      Head: Normocephalic.      Right Ear: External ear normal.      Left Ear: External ear normal.   Eyes:      Conjunctiva/sclera: Conjunctivae normal.   Neck:      Vascular: No carotid bruit.   Cardiovascular:      Rate and Rhythm: Normal rate and regular rhythm.      Pulses: Normal pulses.   Pulmonary:      Effort: Pulmonary effort is normal. No respiratory distress.      Breath sounds: Normal breath sounds. No decreased breath sounds or rales.   Abdominal:      General: Bowel sounds are " normal.      Palpations: Abdomen is soft. There is no hepatomegaly or splenomegaly.      Tenderness: There is no abdominal tenderness. There is no guarding.   Musculoskeletal:      Cervical back: Normal range of motion and neck supple.      Right lower leg: No edema.      Left lower leg: No edema.   Skin:     General: Skin is warm and dry.   Neurological:      Mental Status: She is alert and oriented to person, place, and time.      Gait: Abnormal gait: guarded gait.   Psychiatric:         Mood and Affect: Mood normal.         Behavior: Behavior normal.         Thought Content: Thought content normal.         Cognition and Memory: Cognition normal.         Judgment: Judgment normal.           Lab Results   Component Value Date    WBC 5.51 12/20/2022    RBC 4.10 12/20/2022    HGB 12.7 12/20/2022    HCT 38.3 12/20/2022    MCV 93.4 12/20/2022    MCH 31.0 12/20/2022    MCHC 33.2 12/20/2022    RDW 12.7 12/20/2022    MPV 10.1 08/29/2022     12/20/2022    NEUTRORELPCT 57.2 12/20/2022    LYMPHORELPCT 32.8 12/20/2022    MONORELPCT 5.6 12/20/2022    EOSRELPCT 3.8 12/20/2022    BASORELPCT 0.4 12/20/2022    AUTOIGPER 0.5 08/29/2022    NEUTROABS 3.15 12/20/2022    LYMPHSABS 1.81 12/20/2022    MONOSABS 0.31 12/20/2022    EOSABS 0.21 12/20/2022    BASOSABS 0.02 12/20/2022    AUTOIGNUM 0.04 08/29/2022    NRBC 0.0 12/20/2022     Lab Results   Component Value Date    GLUCOSE 116 (H) 12/20/2022    BUN 15 12/20/2022    CREATININE 0.90 12/20/2022    EGFRIFNONA 53 (L) 08/28/2020    EGFRIFAFRI >60 08/29/2022    BCR 16.7 12/20/2022    K 4.7 12/20/2022    CO2 26.4 12/20/2022    CALCIUM 10.5 (H) 01/04/2023    ALBUMIN 4.30 12/20/2022    LABIL2 2.4 08/24/2022    AST 26 12/20/2022    ALT 25 12/20/2022      Lab Results   Component Value Date    CHLPL 154 12/20/2022    TRIG 181 (H) 12/20/2022    HDL 56 12/20/2022    VLDL 30 12/20/2022    LDL 68 12/20/2022     Lab Results   Component Value Date    HGBA1C 5.7 (H) 01/04/2023     Lab Results    Component Value Date    COLORU Yellow 08/28/2020    CLARITYU Clear 08/28/2020    LEUKOCYTESUR Negative 08/28/2020    BILIRUBINUR Negative 08/28/2020      Records reviewed from [facility] from 4/7/25--4/10/25.    4/7/25 chest x-ray: trace bilateral effusions and associated atelectasis; cardiomegaly.    4/10/15 CBC WNL except Hgb 11.2, Hct 34; BMP WNL except BUN 28, creat 1.11, eGFR 51, chloride 108        Assessment & Plan .  Problem List Items Addressed This Visit       Primary hypertension    Current Assessment & Plan   Hypertension is stable and controlled  Continue current treatment regimen.  Regular aerobic exercise.  Ambulatory blood pressure monitoring.  Blood pressure will be reassessed in 3 months.  Continue Losartan, Amlodipine, and Furosemide daily and Carvedilol twice daily.         Relevant Medications    furosemide (LASIX) 40 MG tablet    Coronary artery disease    Current Assessment & Plan   Continue daily aspirin.  Follow up as scheduled with cardiology.         Hyperlipidemia    Current Assessment & Plan   Continue to work on healthy diet and exercise as tolerated.         Prediabetes    Current Assessment & Plan   Continue to work on decreasing carbs/sugars in diet.         Relevant Orders    Comprehensive Metabolic Panel    Hemoglobin A1c    Statin intolerance    Fatigue    Relevant Orders    CBC & Differential    Iron    Ferritin    Comprehensive Metabolic Panel    Vitamin B12 & Folate    Anemia    Relevant Orders    CBC & Differential    Iron    Ferritin    Vitamin B12 & Folate     Other Visit Diagnoses         Hospital discharge follow-up    -  Primary    Relevant Orders    CBC & Differential    Iron    Ferritin    Comprehensive Metabolic Panel    Vitamin B12 & Folate            Return in about 2 months (around 6/18/2025) for Medicare Wellness, Recheck.or sooner if symptoms persist or worsen.  Overall, doing much better than prior to going to hospital; still having  some fatigue, but no shortness of breath; will recheck labs for further evaluation; [facility] hard stick.

## 2025-04-18 NOTE — PATIENT INSTRUCTIONS
Get labs drawn at [facility].  Continue to monitor your blood pressure periodically and record results.  Continue to work on healthy diet and exercise.  Follow up pending lab results.  Follow up in 2 months for Medicare Wellness, or sooner if problems or concerns.

## 2025-04-19 PROBLEM — D64.9 ANEMIA: Status: ACTIVE | Noted: 2025-04-19

## 2025-04-19 PROBLEM — R73.01 ELEVATED FASTING BLOOD SUGAR: Status: RESOLVED | Noted: 2022-09-19 | Resolved: 2025-04-19

## 2025-04-19 PROBLEM — J20.9 ACUTE BRONCHITIS: Status: RESOLVED | Noted: 2023-03-04 | Resolved: 2025-04-19

## 2025-04-19 PROBLEM — E83.52 HYPERCALCEMIA: Status: RESOLVED | Noted: 2022-12-21 | Resolved: 2025-04-19

## 2025-04-19 PROBLEM — N64.52 NIPPLE DISCHARGE IN FEMALE: Status: RESOLVED | Noted: 2020-08-17 | Resolved: 2025-04-19

## 2025-04-19 NOTE — ASSESSMENT & PLAN NOTE
Hypertension is stable and controlled  Continue current treatment regimen.  Regular aerobic exercise.  Ambulatory blood pressure monitoring.  Blood pressure will be reassessed in 3 months.  Continue Losartan, Amlodipine, and Furosemide daily and Carvedilol twice daily.

## 2025-04-24 LAB
ALBUMIN SERPL-MCNC: 4.6 G/DL (ref 3.8–4.8)
ALP SERPL-CCNC: 87 IU/L (ref 44–121)
ALT SERPL-CCNC: 19 IU/L (ref 0–32)
AMBIG ABBREV CMP14 DEFAULT: NORMAL
AST SERPL-CCNC: 25 IU/L (ref 0–40)
BASOPHILS # BLD AUTO: 0.1 X10E3/UL (ref 0–0.2)
BASOPHILS NFR BLD AUTO: 1 %
BILIRUB SERPL-MCNC: <0.2 MG/DL (ref 0–1.2)
BUN SERPL-MCNC: 41 MG/DL (ref 8–27)
BUN/CREAT SERPL: 30 (ref 12–28)
CALCIUM SERPL-MCNC: 11.2 MG/DL (ref 8.7–10.3)
CHLORIDE SERPL-SCNC: 106 MMOL/L (ref 96–106)
CO2 SERPL-SCNC: 19 MMOL/L (ref 20–29)
CREAT SERPL-MCNC: 1.37 MG/DL (ref 0.57–1)
EGFRCR SERPLBLD CKD-EPI 2021: 39 ML/MIN/1.73
EOSINOPHIL # BLD AUTO: 0.2 X10E3/UL (ref 0–0.4)
EOSINOPHIL NFR BLD AUTO: 2 %
ERYTHROCYTE [DISTWIDTH] IN BLOOD BY AUTOMATED COUNT: 12.3 % (ref 11.7–15.4)
FERRITIN SERPL-MCNC: 219 NG/ML (ref 15–150)
FOLATE SERPL-MCNC: >20 NG/ML
GLOBULIN SER CALC-MCNC: 2.3 G/DL (ref 1.5–4.5)
GLUCOSE SERPL-MCNC: 121 MG/DL (ref 70–99)
HBA1C MFR BLD: 5.7 % (ref 4.8–5.6)
HCT VFR BLD AUTO: 39.1 % (ref 34–46.6)
HGB BLD-MCNC: 12.8 G/DL (ref 11.1–15.9)
IMM GRANULOCYTES # BLD AUTO: 0 X10E3/UL (ref 0–0.1)
IMM GRANULOCYTES NFR BLD AUTO: 0 %
IRON SERPL-MCNC: 80 UG/DL (ref 27–139)
LYMPHOCYTES # BLD AUTO: 2 X10E3/UL (ref 0.7–3.1)
LYMPHOCYTES NFR BLD AUTO: 26 %
MCH RBC QN AUTO: 30.9 PG (ref 26.6–33)
MCHC RBC AUTO-ENTMCNC: 32.7 G/DL (ref 31.5–35.7)
MCV RBC AUTO: 94 FL (ref 79–97)
MONOCYTES # BLD AUTO: 0.4 X10E3/UL (ref 0.1–0.9)
MONOCYTES NFR BLD AUTO: 5 %
NEUTROPHILS # BLD AUTO: 5.1 X10E3/UL (ref 1.4–7)
NEUTROPHILS NFR BLD AUTO: 66 %
PLATELET # BLD AUTO: 228 X10E3/UL (ref 150–450)
POTASSIUM SERPL-SCNC: 4.5 MMOL/L (ref 3.5–5.2)
PROT SERPL-MCNC: 6.9 G/DL (ref 6–8.5)
RBC # BLD AUTO: 4.14 X10E6/UL (ref 3.77–5.28)
SODIUM SERPL-SCNC: 144 MMOL/L (ref 134–144)
VIT B12 SERPL-MCNC: 433 PG/ML (ref 232–1245)
WBC # BLD AUTO: 7.8 X10E3/UL (ref 3.4–10.8)

## 2025-06-20 ENCOUNTER — OFFICE VISIT (OUTPATIENT)
Dept: FAMILY MEDICINE CLINIC | Facility: CLINIC | Age: 80
End: 2025-06-20
Payer: MEDICARE

## 2025-06-20 VITALS
HEART RATE: 60 BPM | TEMPERATURE: 97.1 F | SYSTOLIC BLOOD PRESSURE: 116 MMHG | OXYGEN SATURATION: 97 % | HEIGHT: 61 IN | DIASTOLIC BLOOD PRESSURE: 72 MMHG | WEIGHT: 169.8 LBS | BODY MASS INDEX: 32.06 KG/M2

## 2025-06-20 DIAGNOSIS — Z00.00 ENCOUNTER FOR MEDICARE ANNUAL WELLNESS EXAM: ICD-10-CM

## 2025-06-20 DIAGNOSIS — E78.2 MIXED HYPERLIPIDEMIA: ICD-10-CM

## 2025-06-20 DIAGNOSIS — E83.52 HYPERCALCEMIA: ICD-10-CM

## 2025-06-20 DIAGNOSIS — I10 PRIMARY HYPERTENSION: ICD-10-CM

## 2025-06-20 DIAGNOSIS — Z00.00 ENCOUNTER FOR MEDICARE ANNUAL WELLNESS EXAM: Primary | ICD-10-CM

## 2025-06-20 DIAGNOSIS — Z78.0 POSTMENOPAUSE: ICD-10-CM

## 2025-06-20 DIAGNOSIS — I25.10 CORONARY ARTERY DISEASE INVOLVING NATIVE CORONARY ARTERY OF NATIVE HEART WITHOUT ANGINA PECTORIS: ICD-10-CM

## 2025-06-20 DIAGNOSIS — Z12.31 SCREENING MAMMOGRAM FOR BREAST CANCER: ICD-10-CM

## 2025-06-20 DIAGNOSIS — R73.03 PREDIABETES: ICD-10-CM

## 2025-06-20 NOTE — ASSESSMENT & PLAN NOTE
Intolerant of statins and other medications.  Continue to work on healthy diet and exercise as tolerated.    Orders:    Lipid Panel With LDL / HDL Ratio; Future    Comprehensive Metabolic Panel; Future

## 2025-06-20 NOTE — ASSESSMENT & PLAN NOTE
Hypertension is stable.  Continue current treatment regimen.  Regular aerobic exercise.  Ambulatory blood pressure monitoring.  Blood pressure will be reassessed in 4 months.  Continue Losartan, Amlodipine, and Furosemide daily and Coreg twice daily.  Follow up as scheduled with cardiology.    Orders:    Lipid Panel With LDL / HDL Ratio; Future    Comprehensive Metabolic Panel; Future    TSH Rfx On Abnormal To Free T4; Future

## 2025-06-20 NOTE — PATIENT INSTRUCTIONS
Get labs drawn at [facility].  Continue to monitor your blood pressure periodically and record results.  Continue to work on healthy diet and exercise as tolerated.  Follow up pending lab results.  Follow up in 4 months, or sooner if problems or concerns.       Medicare Wellness  Personal Prevention Plan of Service     Date of Office Visit:    Encounter Provider:  KAYLYNN Gaston  Place of Service:  [facility]  Patient Name: Mayi Hannon  :  1945    As part of the Medicare Wellness portion of your visit today, we are providing you with this personalized preventive plan of services (PPPS). This plan is based upon recommendations of the [place] Preventive Services Task Force (USPSTF) and the Advisory Committee on Immunization Practices (ACIP).    This lists the preventive care services that should be considered, and provides dates of when you are due. Items listed as completed are up-to-date and do not require any further intervention.    Health Maintenance   Topic Date Due    DXA SCAN  Never done    ZOSTER VACCINE (1 of 2) Never done    RSV Vaccine - Adults (1 - 1-dose 75+ series) Never done    COVID-19 Vaccine (2024- season) 2025 (Originally 2024)    INFLUENZA VACCINE  2025    LIPID PANEL  2026    ANNUAL WELLNESS VISIT  2026    TDAP/TD VACCINES (2 - Td or Tdap) 2033    Pneumococcal Vaccine 50+  Completed    COLORECTAL CANCER SCREENING  Discontinued       Orders Placed This Encounter   Procedures    DEXA Bone Density Axial     Standing Status:   Future     Expected Date:   2025     Expiration Date:   2026     Reason for Exam::   postmenopause     Release to patient:   Routine Release [[id number]]     Does this patient have a diabetic monitoring/medication delivering device on?:   No     Is patient taking or have taken long term Glucocorticoid (steroids)?:   No     Does the patient have rheumatoid arthritis?:   No     Does  the patient have secondary osteoporosis?:   No    Mammo Screening Digital Tomosynthesis Bilateral With CAD     Standing Status:   Future     Number of Occurrences:   1     Expected Date:   6/25/2025     Expiration Date:   6/20/2026     Scheduling Instructions:      Pt requests [facility]     Reason for Exam::   screening mammo     Does this patient have a diabetic monitoring/medication delivering device on?:   No     Release to patient:   Routine Release [[id number]]    Vitamin D,25-Hydroxy     Standing Status:   Future     Number of Occurrences:   1     Expected Date:   7/4/2025     Expiration Date:   9/20/2026     Release to patient:   Routine Release [[id number]]    PTH, Intact     Standing Status:   Future     Number of Occurrences:   1     Expected Date:   7/4/2025     Expiration Date:   9/20/2026     Release to patient:   Routine Release [[id number]]    Lipid Panel With LDL / HDL Ratio     Standing Status:   Future     Number of Occurrences:   1     Expected Date:   7/4/2025     Expiration Date:   9/20/2026     Release to patient:   Routine Release [[id number]]    Comprehensive Metabolic Panel     Standing Status:   Future     Number of Occurrences:   1     Expected Date:   7/4/2025     Expiration Date:   9/20/2026     Release to patient:   Routine Release [[id number]]       Return in about 4 months (around 10/20/2025) for Recheck.

## 2025-06-20 NOTE — PROGRESS NOTES
Subjective   The ABCs of the Annual Wellness Visit  Medicare Wellness Visit      Mayi Hannon is a 80 y.o. patient who presents for a Medicare Wellness Visit.    The following portions of the patient's history were reviewed and   updated as appropriate: allergies, current medications, past family history, past medical history, past social history, past surgical history, and problem list.    Compared to one year ago, the patient's physical   health is worse.  Compared to one year ago, the patient's mental   health is the same.    Recent Hospitalizations:  She was admitted within the past 365 days at [facility] April 2025.    Current Medical Providers:  Patient Care Team:  Rodri Haley MD as PCP - General (Internal Medicine)  Giorgio Ferrer MD as Consulting Physician (Cardiology)  Leonardo Sims MD (Cardiothoracic Surgery)    Outpatient Medications Prior to Visit   Medication Sig Dispense Refill    albuterol (PROVENTIL) (2.5 MG/3ML) 0.083% nebulizer solution Take 2.5 mg by nebulization Every 4 (Four) Hours As Needed for Wheezing.      amLODIPine (NORVASC) 5 MG tablet Take 1 tablet by mouth Daily. 90 tablet 1    aspirin  MG tablet Take 1 tablet by mouth Daily.      carvedilol (COREG) 25 MG tablet Take 1 tablet by mouth 2 (two) times a day.      cholecalciferol (VITAMIN D3) 25 MCG (1000 UT) tablet Take 1 tablet by mouth Daily. With K2      CHROMIUM PO Take 1 tablet by mouth Daily.      KRILL OIL PO Take 1 tablet by mouth Daily.      loratadine (CLARITIN) 10 MG tablet Take 1 tablet by mouth.      losartan (COZAAR) 100 MG tablet Take 1 tablet by mouth Daily. 90 tablet 1    MAGNESIUM PO Take 1 tablet by mouth Every Night.      Multiple Vitamin (MULTIVITAMIN) capsule Take 1 capsule by mouth Daily.      Multiple Vitamins-Minerals (ZINC PO) Take 1 tablet by mouth Every Other Day.      nitroglycerin (NITROSTAT) 0.4 MG SL tablet Place 1 tablet under the tongue Every 5 (Five)  Minutes As Needed.      Probiotic Product (PROBIOTIC PO) Take  by mouth. Patient takes a probiotic every other day      furosemide (LASIX) 40 MG tablet Take 1 tablet by mouth Daily.       No facility-administered medications prior to visit.     No opioid medication identified on active medication list. I have reviewed chart for other potential  high risk medication/s and harmful drug interactions in the elderly.      Aspirin is on active medication list. Aspirin use is indicated based on review of current medical condition/s. Pros and cons of this therapy have been discussed today. Benefits of this medication outweigh potential harm.  Patient has been encouraged to continue taking this medication.  .      Patient Active Problem List   Diagnosis    Primary hypertension    Coronary artery disease    GERD (gastroesophageal reflux disease)    Hyperlipidemia    LBBB (left bundle branch block)    Myocardial infarction    S/P coronary artery stent placement    Thyroid disease    Eczema    Lumbar herniated disc    Cervical herniated disc    Colon cancer screening    Family history of colon cancer in father    Rash and nonspecific skin eruption    Urinary frequency    Family history of malignant neoplasm of colon    S/P CABG (coronary artery bypass graft)    Posterior vitreous detachment    Chorioretinal scar    Prediabetes    Traumatic ecchymosis of right hand    Lump of skin of left lower extremity    Family history of malignant neoplasm of digestive organs    Long term (current) use of aspirin    Presence of coronary angioplasty implant and graft    Acute non-recurrent sinusitis    Statin intolerance    Medicare annual wellness visit, subsequent    Fatigue    Shortness of breath    Anemia     Advance Care Planning Advance Directive is not on file.  ACP discussion was held with the patient during this visit. Patient has an advance directive (not in EMR), copy requested.        Objective   Vitals:    06/20/25 1252   BP:  "116/72   BP Location: Left arm   Patient Position: Sitting   Cuff Size: Adult   Pulse: 60   Temp: 97.1 °F (36.2 °C)   TempSrc: Temporal   SpO2: 97%   Weight: 77 kg (169 lb 12.8 oz)   Height: 154.9 cm (60.98\")   PainSc: 0-No pain       Estimated body mass index is 32.1 kg/m² as calculated from the following:    Height as of this encounter: 154.9 cm (60.98\").    Weight as of this encounter: 77 kg (169 lb 12.8 oz).    BMI is >= 30 and <35. (Class 1 Obesity). The following options were offered after discussion;: exercise counseling/recommendations and nutrition counseling/recommendations           Does the patient have evidence of cognitive impairment? No as evidenced by mini-cog assessment, see results.      Lab Results   Component Value Date    HGBA1C 5.7 (H) 2025                                                                                                Health  Risk Assessment    Smoking Status:  Social History     Tobacco Use   Smoking Status Former    Current packs/day: 0.00    Types: Cigarettes    Quit date:     Years since quittin.5    Passive exposure: Past   Smokeless Tobacco Never     Alcohol Consumption:  Social History     Substance and Sexual Activity   Alcohol Use Yes    Comment: occ       Fall Risk Screen  STEADI Fall Risk Assessment was completed, and patient is at LOW risk for falls.Assessment completed on:2025    Depression Screening   Little interest or pleasure in doing things? Not at all   Feeling down, depressed, or hopeless? Not at all   PHQ-2 Total Score 0      Health Habits and Functional and Cognitive Screenin/20/2025    12:54 PM   Functional & Cognitive Status   Do you have difficulty preparing food and eating? No   Do you have difficulty bathing yourself, getting dressed or grooming yourself? No   Do you have difficulty using the toilet? No   Do you have difficulty moving around from place to place? No   Do you have trouble with steps or getting out of a bed or " a chair? Yes   Current Diet Other   Dental Exam Not up to date   Eye Exam Not up to date   Exercise (times per week) 0 times per week   Current Exercises Include No Regular Exercise   Do you need help using the phone?  No   Are you deaf or do you have serious difficulty hearing?  No   Do you need help to go to places out of walking distance? No   Do you need help shopping? No   Do you need help preparing meals?  No   Do you need help with housework?  No   Do you need help with laundry? No   Do you need help taking your medications? No   Do you need help managing money? No   Do you ever drive or ride in a car without wearing a seat belt? No   Have you felt unusual stress, anger or loneliness in the last month? No   Who do you live with? Spouse   If you need help, do you have trouble finding someone available to you? No   Have you been bothered in the last four weeks by sexual problems? No   Do you have difficulty concentrating, remembering or making decisions? No           Age-appropriate Screening Schedule:  Refer to the list below for future screening recommendations based on patient's age, sex and/or medical conditions. Orders for these recommended tests are listed in the plan section. The patient has been provided with a written plan.    Health Maintenance List  Health Maintenance   Topic Date Due    DXA SCAN  Never done    ZOSTER VACCINE (1 of 2) Never done    RSV Vaccine - Adults (1 - 1-dose 75+ series) Never done    COVID-19 Vaccine (7 - 2024-25 season) 12/20/2025 (Originally 9/1/2024)    INFLUENZA VACCINE  07/01/2025    LIPID PANEL  04/07/2026    ANNUAL WELLNESS VISIT  06/20/2026    TDAP/TD VACCINES (2 - Td or Tdap) 11/13/2033    Pneumococcal Vaccine 50+  Completed    COLORECTAL CANCER SCREENING  Discontinued     Bed is high, so uses step stool to get in bed.  Plans to schedule eye exam and dental exam.  Declines Shingrix.  Had RSV vaccine at [facility] about 2 years ago                                                                                                                                                CMS Preventative Services Quick Reference  Risk Factors Identified During Encounter  Immunizations Discussed/Encouraged: Shingrix and RSV (Respiratory Syncytial Virus)  Dental Screening Recommended  Vision Screening Recommended    The above risks/problems have been discussed with the patient.    Discussed low risk for falls and recommended avoiding throw rugs, installing grab bars in bathroom, making sure have handrails on steps, etc.    Pertinent information has been shared with the patient in the After Visit Summary.  An After Visit Summary and PPPS were made available to the patient.    Follow Up:   Next Medicare Wellness visit to be scheduled in 1 year.         Additional E&M Note during same encounter follows:  Patient has additional, significant, and separately identifiable condition(s)/problem(s) that require work above and beyond the Medicare Wellness Visit     Chief Complaint  Medicare Wellness-subsequent    Subjective   HPI  Mayi is also being seen today for additional medical problem/s.  F/U HTN: takes Losartan, Amlodipine, and Furosemide daily and Coreg twice daily; does not monitor BP; no headaches; no orthostasis; no swelling.     F/U CAD: takes daily aspirin, no longer taking Imdur; sees cardiology Dr. Ferrer; had follow up in May and no changes; has follow up with cardiology in August.     F/U Hyperlipidemia: intolerant of statins, has also tried Nexletol and Zetia; PCSK9 inhibitor unaffordable; trying to do better watching saturated fats in diet; has been avoiding vegetable oil and using olive oil to cook.    F/U prediabetes: last A1c 5.7%; tries to watch carb/sugars in diet; not much exercise due to fatigue; plans to try chair exercises and see if tolerated.     Uses Albuterol nebulizer as needed; has not needed to use recently.    F/U Hypercalcemia: has not been taking calcium  "supplement; needs repeat labs       was present during the history-taking and subsequent discussion with this patient.  Patient agrees to the presence of the individual during this visit.     Review of Systems   Constitutional:  Positive for fatigue (some with activity). Negative for appetite change, chills and fever.   HENT:  Negative for ear pain, sore throat and trouble swallowing. Sinus pressure: some at times.   Eyes:  Negative for visual disturbance. Eye discharge: has watery drainage.  Respiratory:  Positive for cough (some nonproductive cough). Chest tightness: some at times. Shortness of breath: some at times.   Cardiovascular:  Negative for chest pain and palpitations.   Gastrointestinal:  Negative for abdominal pain and blood in stool. Constipation: some at times, probioitic helps; needs to drink more water. Diarrhea: some at times, probioitic helps; notes if goes too long without eating since had darrell.  Endocrine: Negative for cold intolerance, heat intolerance and polydipsia.   Genitourinary:  Negative for dysuria and frequency.   Musculoskeletal:  Negative for arthralgias. Back pain: some on occasion, no radiation of pain.  Skin:  Negative for rash.   Neurological:  Negative for syncope. Weakness: some in general.  Hematological:  Does not bruise/bleed easily.          Objective   Vital Signs:  /72 (BP Location: Left arm, Patient Position: Sitting, Cuff Size: Adult)   Pulse 60   Temp 97.1 °F (36.2 °C) (Temporal)   Ht 154.9 cm (60.98\")   Wt 77 kg (169 lb 12.8 oz)   SpO2 97%   BMI 32.10 kg/m²     Physical Exam  Vitals and nursing note reviewed.   Constitutional:       General: She is not in acute distress.     Appearance: She is well-developed and well-groomed. She is not diaphoretic.   HENT:      Head: Normocephalic and atraumatic.      Jaw: No tenderness or pain on movement.      Right Ear: Tympanic membrane and external ear normal. No decreased hearing noted.      Left Ear: " External ear normal. No decreased hearing noted. Left ear middle ear effusion: TM dull. Tympanic membrane is not erythematous.      Nose: Nose normal.      Right Sinus: No maxillary sinus tenderness or frontal sinus tenderness.      Left Sinus: No maxillary sinus tenderness or frontal sinus tenderness.      Mouth/Throat:      Mouth: Mucous membranes are moist.      Pharynx: No oropharyngeal exudate or posterior oropharyngeal erythema.   Eyes:      Extraocular Movements: Extraocular movements intact.      Conjunctiva/sclera: Conjunctivae normal.      Pupils: Pupils are equal, round, and reactive to light.   Neck:      Thyroid: No thyromegaly.      Vascular: No carotid bruit.      Trachea: No tracheal deviation.   Cardiovascular:      Rate and Rhythm: Normal rate and regular rhythm.      Pulses: Normal pulses.      Heart sounds: Normal heart sounds.   Pulmonary:      Effort: Pulmonary effort is normal. No respiratory distress.      Breath sounds: Normal breath sounds.   Abdominal:      General: Bowel sounds are normal.      Palpations: Abdomen is soft. There is no hepatomegaly or splenomegaly.      Tenderness: There is no abdominal tenderness. There is no guarding.   Musculoskeletal:         General: Normal range of motion.      Cervical back: Normal range of motion and neck supple. No bony tenderness.      Thoracic back: No bony tenderness.      Lumbar back: No bony tenderness.      Right lower leg: No edema.      Left lower leg: No edema.   Lymphadenopathy:      Cervical: No cervical adenopathy.   Skin:     General: Skin is warm and dry.      Findings: No rash.   Neurological:      Mental Status: She is alert and oriented to person, place, and time.      Cranial Nerves: No cranial nerve deficit.      Motor: Motor function is intact.      Coordination: Coordination normal.      Gait: Gait normal.      Deep Tendon Reflexes: Reflexes are normal and symmetric.   Psychiatric:         Mood and Affect: Mood normal.          Behavior: Behavior normal.         Thought Content: Thought content normal.         Cognition and Memory: Cognition normal.         Judgment: Judgment normal.         Lab Results   Component Value Date    WBC 7.8 04/23/2025    RBC 4.14 04/23/2025    HGB 12.8 04/23/2025    HCT 39.1 04/23/2025    MCV 94 04/23/2025    MCH 30.9 04/23/2025    MCHC 32.7 04/23/2025    RDW 12.3 04/23/2025    MPV 10.1 08/29/2022     04/23/2025    NEUTRORELPCT 66 04/23/2025    LYMPHORELPCT 26 04/23/2025    MONORELPCT 5 04/23/2025    EOSRELPCT 2 04/23/2025    BASORELPCT 1 04/23/2025    AUTOIGPER 0.5 08/29/2022    NEUTROABS 5.1 04/23/2025    LYMPHSABS 2.0 04/23/2025    MONOSABS 0.4 04/23/2025    EOSABS 0.2 04/23/2025    BASOSABS 0.1 04/23/2025    AUTOIGNUM 0.04 08/29/2022    NRBC 0.0 12/20/2022     Lab Results   Component Value Date    GLUCOSE 121 (H) 04/23/2025    BUN 41 (H) 04/23/2025    CREATININE 1.37 (H) 04/23/2025    EGFRIFNONA 53 (L) 08/28/2020    EGFRIFAFRI >60 08/29/2022    BCR 30 (H) 04/23/2025    K 4.5 04/23/2025    CO2 19 (L) 04/23/2025    CALCIUM 11.2 (H) 04/23/2025    ALBUMIN 4.6 04/23/2025    LABIL2 2.4 08/24/2022    AST 25 04/23/2025    ALT 19 04/23/2025      Lab Results   Component Value Date    CHLPL 154 12/20/2022    TRIG 181 (H) 12/20/2022    HDL 56 12/20/2022    VLDL 30 12/20/2022    LDL 68 12/20/2022     Lab Results   Component Value Date    HGBA1C 5.7 (H) 04/23/2025     Lab Results   Component Value Date    COLORU Yellow 08/28/2020    CLARITYU Clear 08/28/2020    LEUKOCYTESUR Negative 08/28/2020    BILIRUBINUR Negative 08/28/2020 5/5/25 BMP WNL except glucose 136, eGFR 48, creat 1.16, chloride 110, calcium 10.7         Assessment and Plan      Encounter for Medicare annual wellness exam    Orders:    Vitamin D,25-Hydroxy; Future    PTH, Intact; Future    Lipid Panel With LDL / HDL Ratio; Future    Comprehensive Metabolic Panel; Future    Screening mammogram for breast cancer    Orders:    Mammo Screening  Digital Tomosynthesis Bilateral With CAD; Future    Postmenopause    Orders:    DEXA Bone Density Axial; Future    Primary hypertension  Hypertension is stable.  Continue current treatment regimen.  Regular aerobic exercise.  Ambulatory blood pressure monitoring.  Blood pressure will be reassessed in 4 months.  Continue Losartan, Amlodipine, and Furosemide daily and Coreg twice daily.  Follow up as scheduled with cardiology.    Orders:    Lipid Panel With LDL / HDL Ratio; Future    Comprehensive Metabolic Panel; Future    TSH Rfx On Abnormal To Free T4; Future    Mixed hyperlipidemia  Intolerant of statins and other medications.  Continue to work on healthy diet and exercise as tolerated.    Orders:    Lipid Panel With LDL / HDL Ratio; Future    Comprehensive Metabolic Panel; Future    Hypercalcemia    Orders:    Vitamin D,25-Hydroxy; Future    PTH, Intact; Future    Comprehensive Metabolic Panel; Future    Coronary artery disease involving native coronary artery of native heart without angina pectoris  Continue daily aspirin.  Follow-up as scheduled with cardiology.         Prediabetes  Continue to work on decreasing carbs/sugars in diet.                 Follow Up   Return in about 4 months (around 10/20/2025) for Recheck.or sooner if symptoms persist or worsen.  [facility]        Patient was given instructions and counseling regarding her condition or for health maintenance advice. Please see specific information pulled into the AVS if appropriate.

## 2025-07-21 ENCOUNTER — READMISSION MANAGEMENT (OUTPATIENT)
Dept: CALL CENTER | Facility: HOSPITAL | Age: 80
End: 2025-07-21
Payer: MEDICARE

## 2025-07-21 NOTE — OUTREACH NOTE
Prep Survey      Flowsheet Row Responses   [facility] facility patient discharged from? Non-[facility]   Is LACE score < 7 ? Non-BH Discharge   Eligibility [facility]   Date of Admission 07/18/25   Date of Discharge 07/21/25   Discharge Disposition Home or Self Care   Discharge diagnosis NSTEMI   Does the patient have one of the following disease processes/diagnoses(primary or secondary)? Acute MI (STEMI,NSTEMI)   Does the patient have Home health ordered? Yes   What is the Home health agency?  Home health   Is there a DME ordered? No   Prep survey completed? Yes            DEE DEVINE - Registered Nurse

## 2025-07-22 ENCOUNTER — TRANSITIONAL CARE MANAGEMENT TELEPHONE ENCOUNTER (OUTPATIENT)
Dept: CALL CENTER | Facility: HOSPITAL | Age: 80
End: 2025-07-22
Payer: MEDICARE

## 2025-07-22 NOTE — OUTREACH NOTE
Call Center TCM Note      Flowsheet Row Responses   [facility] [place] patient discharged from? Non-  [[facility]]   Does the patient have one of the following disease processes/diagnoses(primary or secondary)? Acute MI (STEMI,NSTEMI)   TCM attempt successful? Yes   Call start time 1520   Call end time 1523   Discharge diagnosis NSTEMI   Meds reviewed with patient/caregiver? Yes   Is the patient having any side effects they believe may be caused by any medication additions or changes? No   Does the patient have all medications ordered at discharge? Yes   Is the patient taking all medications as directed (includes completed medication regime)? Yes   Does the patient have an appointment with their PCP within 7-14 days of discharge? No appointments available   Nursing Interventions Patient desires to follow up with specialty only, Routed TCM call to PCP office, Patient declined scheduling/rescheduling appointment at this time   What is the Home health agency?  Home health   Has home health visited the patient within 72 hours of discharge? Yes   Psychosocial issues? No   Did the patient receive a copy of their discharge instructions? Yes   Nursing interventions Reviewed instructions with patient   What is the patient's perception of their health status since discharge? Improving   Is the patient/caregiver able to teach back signs and symptoms related to disease process for when to call PCP? Yes   Is the patient/caregiver able to teach back signs and symptoms related to disease process for when to call 911? Yes   Is the patient/caregiver able to teach back the hierarchy of who to call/visit for symptoms/problems? PCP, Specialist, Home health nurse, Urgent Care, ED, 911 Yes   If the patient is a current smoker, are they able to teach back resources for cessation? Not a smoker   TCM call completed? Yes   Wrap up additional comments D/C DX,  NSTEMI,  heart cath - Pt feels much better. New meds in place. No questions.  Pt for now declines TCM APPT wit PCP Dr Haley. She will call if sooner appt needed before scheduled ov 10/20/2025   Call end time 1523            CHANDANA STONE - Medical Assistant    7/22/2025, 15:25 EDT

## 2025-07-25 ENCOUNTER — TELEPHONE (OUTPATIENT)
Dept: FAMILY MEDICINE CLINIC | Facility: CLINIC | Age: 80
End: 2025-07-25
Payer: MEDICARE

## 2025-07-28 ENCOUNTER — PATIENT MESSAGE (OUTPATIENT)
Dept: FAMILY MEDICINE CLINIC | Facility: CLINIC | Age: 80
End: 2025-07-28
Payer: MEDICARE

## 2025-07-29 ENCOUNTER — TELEPHONE (OUTPATIENT)
Dept: FAMILY MEDICINE CLINIC | Facility: CLINIC | Age: 80
End: 2025-07-29
Payer: MEDICARE

## 2025-07-29 NOTE — TELEPHONE ENCOUNTER
I called and spoke with Keiry and let her know that because she is not on the verbal release paperwork I could not release any information to her. I let her know that I had attempted to reach jesus multiple times over this paperwork but never heard anything back. I did let her know we had paperwork but that in order to speak to her about any details she would need to be put on the release form or have jesus call and we could give jesus the information to relay. She verbalized understanding and asked if she could come  a form for her to fill out. I let her know that she would have to be in office to fill out the forms. She verbalized understanding and was going to call jesus and go from there. She will call back if needed.

## 2025-08-06 ENCOUNTER — OFFICE VISIT (OUTPATIENT)
Dept: FAMILY MEDICINE CLINIC | Facility: CLINIC | Age: 80
End: 2025-08-06
Payer: MEDICARE

## 2025-08-06 ENCOUNTER — TELEPHONE (OUTPATIENT)
Dept: FAMILY MEDICINE CLINIC | Facility: CLINIC | Age: 80
End: 2025-08-06
Payer: MEDICARE

## 2025-08-06 VITALS
HEART RATE: 55 BPM | HEIGHT: 61 IN | TEMPERATURE: 97.8 F | SYSTOLIC BLOOD PRESSURE: 102 MMHG | DIASTOLIC BLOOD PRESSURE: 50 MMHG | BODY MASS INDEX: 31.98 KG/M2 | WEIGHT: 169.4 LBS | OXYGEN SATURATION: 96 %

## 2025-08-06 DIAGNOSIS — R94.4 DECREASED GLOMERULAR FILTRATION RATE (GFR): ICD-10-CM

## 2025-08-06 DIAGNOSIS — I50.31 ACUTE DIASTOLIC CHF (CONGESTIVE HEART FAILURE): ICD-10-CM

## 2025-08-06 DIAGNOSIS — E78.2 MIXED HYPERLIPIDEMIA: ICD-10-CM

## 2025-08-06 DIAGNOSIS — R73.03 PREDIABETES: ICD-10-CM

## 2025-08-06 DIAGNOSIS — I25.10 CORONARY ARTERY DISEASE INVOLVING NATIVE CORONARY ARTERY OF NATIVE HEART WITHOUT ANGINA PECTORIS: ICD-10-CM

## 2025-08-06 DIAGNOSIS — I21.4 NSTEMI (NON-ST ELEVATED MYOCARDIAL INFARCTION): ICD-10-CM

## 2025-08-06 DIAGNOSIS — E83.52 HYPERCALCEMIA: ICD-10-CM

## 2025-08-06 DIAGNOSIS — D64.9 ANEMIA, UNSPECIFIED TYPE: ICD-10-CM

## 2025-08-06 DIAGNOSIS — Z09 HOSPITAL DISCHARGE FOLLOW-UP: Primary | ICD-10-CM

## 2025-08-06 DIAGNOSIS — I10 PRIMARY HYPERTENSION: ICD-10-CM

## 2025-08-06 DIAGNOSIS — R10.816 EPIGASTRIC ABDOMINAL TENDERNESS WITHOUT REBOUND TENDERNESS: ICD-10-CM

## 2025-08-06 RX ORDER — CLOPIDOGREL BISULFATE 75 MG/1
75 TABLET ORAL DAILY
COMMUNITY
Start: 2025-07-22

## 2025-08-07 ENCOUNTER — TELEPHONE (OUTPATIENT)
Dept: FAMILY MEDICINE CLINIC | Facility: CLINIC | Age: 80
End: 2025-08-07
Payer: MEDICARE

## 2025-08-07 LAB
25(OH)D3+25(OH)D2 SERPL-MCNC: 96.4 NG/ML (ref 30–100)
ALBUMIN SERPL-MCNC: 4.2 G/DL (ref 3.8–4.8)
ALP SERPL-CCNC: 67 IU/L (ref 44–121)
ALT SERPL-CCNC: 25 IU/L (ref 0–32)
AST SERPL-CCNC: 33 IU/L (ref 0–40)
BASOPHILS # BLD AUTO: 0 X10E3/UL (ref 0–0.2)
BASOPHILS NFR BLD AUTO: 1 %
BILIRUB SERPL-MCNC: 0.4 MG/DL (ref 0–1.2)
BUN SERPL-MCNC: 45 MG/DL (ref 8–27)
BUN/CREAT SERPL: 27 (ref 12–28)
CALCIUM SERPL-MCNC: 10.9 MG/DL (ref 8.7–10.3)
CHLORIDE SERPL-SCNC: 100 MMOL/L (ref 96–106)
CO2 SERPL-SCNC: 20 MMOL/L (ref 20–29)
CREAT SERPL-MCNC: 1.64 MG/DL (ref 0.57–1)
EGFRCR SERPLBLD CKD-EPI 2021: 31 ML/MIN/1.73
EOSINOPHIL # BLD AUTO: 0.2 X10E3/UL (ref 0–0.4)
EOSINOPHIL NFR BLD AUTO: 3 %
ERYTHROCYTE [DISTWIDTH] IN BLOOD BY AUTOMATED COUNT: 12.6 % (ref 11.7–15.4)
FERRITIN SERPL-MCNC: 216 NG/ML (ref 15–150)
FOLATE SERPL-MCNC: >20 NG/ML
GLOBULIN SER CALC-MCNC: 2.3 G/DL (ref 1.5–4.5)
GLUCOSE SERPL-MCNC: 107 MG/DL (ref 70–99)
HCT VFR BLD AUTO: 35.3 % (ref 34–46.6)
HGB BLD-MCNC: 11.4 G/DL (ref 11.1–15.9)
IMM GRANULOCYTES # BLD AUTO: 0 X10E3/UL (ref 0–0.1)
IMM GRANULOCYTES NFR BLD AUTO: 0 %
IRON SERPL-MCNC: 68 UG/DL (ref 27–139)
LIPASE SERPL-CCNC: 66 U/L (ref 14–85)
LYMPHOCYTES # BLD AUTO: 1.5 X10E3/UL (ref 0.7–3.1)
LYMPHOCYTES NFR BLD AUTO: 22 %
MCH RBC QN AUTO: 30.8 PG (ref 26.6–33)
MCHC RBC AUTO-ENTMCNC: 32.3 G/DL (ref 31.5–35.7)
MCV RBC AUTO: 95 FL (ref 79–97)
MONOCYTES # BLD AUTO: 0.4 X10E3/UL (ref 0.1–0.9)
MONOCYTES NFR BLD AUTO: 6 %
NEUTROPHILS # BLD AUTO: 4.6 X10E3/UL (ref 1.4–7)
NEUTROPHILS NFR BLD AUTO: 68 %
PLATELET # BLD AUTO: 179 X10E3/UL (ref 150–450)
POTASSIUM SERPL-SCNC: 4.6 MMOL/L (ref 3.5–5.2)
PROT SERPL-MCNC: 6.5 G/DL (ref 6–8.5)
PTH-INTACT SERPL-MCNC: 48 PG/ML (ref 15–65)
RBC # BLD AUTO: 3.7 X10E6/UL (ref 3.77–5.28)
SODIUM SERPL-SCNC: 139 MMOL/L (ref 134–144)
T4 FREE SERPL-MCNC: 1.55 NG/DL (ref 0.82–1.77)
TSH SERPL DL<=0.005 MIU/L-ACNC: 8.2 UIU/ML (ref 0.45–4.5)
VIT B12 SERPL-MCNC: 616 PG/ML (ref 232–1245)
WBC # BLD AUTO: 6.7 X10E3/UL (ref 3.4–10.8)

## 2025-08-08 ENCOUNTER — TELEPHONE (OUTPATIENT)
Dept: FAMILY MEDICINE CLINIC | Facility: CLINIC | Age: 80
End: 2025-08-08
Payer: MEDICARE

## 2025-08-08 DIAGNOSIS — E55.9 VITAMIN D DEFICIENCY: Primary | ICD-10-CM

## 2025-08-09 PROBLEM — E83.52 HYPERCALCEMIA: Status: ACTIVE | Noted: 2025-08-09

## 2025-08-09 PROBLEM — I50.31 ACUTE DIASTOLIC CHF (CONGESTIVE HEART FAILURE): Status: ACTIVE | Noted: 2025-08-09

## 2025-08-09 PROBLEM — R94.4 DECREASED GLOMERULAR FILTRATION RATE (GFR): Status: ACTIVE | Noted: 2025-08-09

## 2025-08-09 PROBLEM — R10.816 EPIGASTRIC ABDOMINAL TENDERNESS WITHOUT REBOUND TENDERNESS: Status: ACTIVE | Noted: 2025-08-09

## 2025-08-09 PROBLEM — I21.4 NSTEMI (NON-ST ELEVATED MYOCARDIAL INFARCTION): Status: ACTIVE | Noted: 2025-08-09

## 2025-08-15 ENCOUNTER — TELEPHONE (OUTPATIENT)
Dept: FAMILY MEDICINE CLINIC | Facility: CLINIC | Age: 80
End: 2025-08-15
Payer: MEDICARE

## 2025-08-25 ENCOUNTER — READMISSION MANAGEMENT (OUTPATIENT)
Dept: CALL CENTER | Facility: HOSPITAL | Age: 80
End: 2025-08-25
Payer: MEDICARE

## 2025-08-25 ENCOUNTER — TELEPHONE (OUTPATIENT)
Dept: FAMILY MEDICINE CLINIC | Facility: CLINIC | Age: 80
End: 2025-08-25
Payer: MEDICARE

## (undated) DEVICE — LN SMPL CO2 SHTRM SD STREAM W/M LUER

## (undated) DEVICE — SENSR O2 OXIMAX FNGR A/ 18IN NONSTR

## (undated) DEVICE — TUBING, SUCTION, 1/4" X 10', STRAIGHT: Brand: MEDLINE

## (undated) DEVICE — CANN O2 ETCO2 FITS ALL CONN CO2 SMPL A/ 7IN DISP LF

## (undated) DEVICE — ADAPT CLN BIOGUARD AIR/H2O DISP

## (undated) DEVICE — THE TORRENT IRRIGATION SCOPE CONNECTOR IS USED WITH THE TORRENT IRRIGATION TUBING TO PROVIDE IRRIGATION FLUIDS SUCH AS STERILE WATER DURING GASTROINTESTINAL ENDOSCOPIC PROCEDURES WHEN USED IN CONJUNCTION WITH AN IRRIGATION PUMP (OR ELECTROSURGICAL UNIT).: Brand: TORRENT

## (undated) DEVICE — KT ORCA ORCAPOD DISP STRL